# Patient Record
Sex: MALE | Race: WHITE | NOT HISPANIC OR LATINO | Employment: FULL TIME | URBAN - NONMETROPOLITAN AREA
[De-identification: names, ages, dates, MRNs, and addresses within clinical notes are randomized per-mention and may not be internally consistent; named-entity substitution may affect disease eponyms.]

---

## 2019-05-28 RX ORDER — FINASTERIDE 5 MG/1
5 TABLET, FILM COATED ORAL DAILY
COMMUNITY
End: 2021-08-06

## 2019-05-28 RX ORDER — DEXLANSOPRAZOLE 60 MG/1
60 CAPSULE, DELAYED RELEASE ORAL DAILY
COMMUNITY
End: 2022-09-23

## 2019-05-28 RX ORDER — OMEPRAZOLE 20 MG/1
20 CAPSULE, DELAYED RELEASE ORAL DAILY
COMMUNITY
End: 2019-05-30

## 2019-05-28 RX ORDER — TAMSULOSIN HYDROCHLORIDE 0.4 MG/1
1 CAPSULE ORAL NIGHTLY
COMMUNITY
End: 2021-08-06

## 2019-05-28 RX ORDER — OXYBUTYNIN CHLORIDE 5 MG/1
5 TABLET ORAL 2 TIMES DAILY
COMMUNITY
End: 2019-05-30

## 2019-05-30 ENCOUNTER — OFFICE VISIT (OUTPATIENT)
Dept: CARDIOLOGY | Facility: CLINIC | Age: 59
End: 2019-05-30

## 2019-05-30 VITALS
BODY MASS INDEX: 26.07 KG/M2 | OXYGEN SATURATION: 99 % | DIASTOLIC BLOOD PRESSURE: 80 MMHG | WEIGHT: 172 LBS | SYSTOLIC BLOOD PRESSURE: 114 MMHG | HEIGHT: 68 IN | HEART RATE: 82 BPM

## 2019-05-30 DIAGNOSIS — R06.02 SHORTNESS OF BREATH: ICD-10-CM

## 2019-05-30 DIAGNOSIS — R07.89 ATYPICAL CHEST PAIN: Primary | ICD-10-CM

## 2019-05-30 PROCEDURE — 93000 ELECTROCARDIOGRAM COMPLETE: CPT | Performed by: INTERNAL MEDICINE

## 2019-05-30 PROCEDURE — 99203 OFFICE O/P NEW LOW 30 MIN: CPT | Performed by: INTERNAL MEDICINE

## 2019-05-30 NOTE — PROGRESS NOTES
Subjective:     Encounter Date:05/30/2019      Patient ID: Zion Ortiz is a 58 y.o. male who is referred here for further evaluation after a recent abnormal ECG and complaints of intermittent chest discomfort.    Referring provider: Brian Obrien MD  Reason for referral: Abnormal ECG    Chief Complaint: Chest discomfort    Chest Pain    This is a recurrent problem. The onset quality is sudden. The problem occurs intermittently. The problem has been unchanged. The pain is present in the substernal region. The pain is mild. The quality of the pain is described as pressure. The pain does not radiate. Associated symptoms include shortness of breath. Pertinent negatives include no abdominal pain, back pain, claudication, cough, dizziness, exertional chest pressure, fever, headaches, hemoptysis, nausea, numbness, orthopnea, palpitations, PND, syncope or vomiting. The pain is aggravated by nothing. He has tried nothing for the symptoms. Risk factors include male gender.   Pertinent negatives for past medical history include no CAD, no hyperlipidemia and no hypertension.   Pertinent negatives for family medical history include: no CAD.     This is a 58-year-old male with no prior cardiac history but with a history of gastroesophageal reflux disease and chronic intermittent chest discomfort, referred here after recent abnormal ECG.  Patient says that he has a long-standing history of gastroesophageal reflux disease.  From this, he has had intermittent substernal chest discomfort, described as mild, intermittent, burning, not related to activities, nonradiating, no associated signs or symptoms.  In addition, he notes some sharp left-sided chest discomfort intermittently, once again not related to activities, mild, lasting seconds, spontaneously resolving.  The patient had an EKG performed at Dr. Obrien office which showed borderline criteria for left anterior fascicular block with left axis deviation.  The patient was  referred here for further evaluation.  The patient describes mild chronic shortness of breath and dyspnea on exertion, not out of proportion to his level of activities, however.  He denies orthopnea, PND, edema.  He denies lightheadedness, dizziness, syncope, palpitations.    The following portions of the patient's history were reviewed and updated as appropriate: allergies, current medications, past family history, past medical history, past social history, past surgical history and problem list.     Past Medical History:   Diagnosis Date   • Abnormal ECG    • BPH (benign prostatic hyperplasia)    • GERD (gastroesophageal reflux disease)    • Vertigo      History reviewed. No pertinent surgical history.      Current Outpatient Medications:   •  dexlansoprazole (DEXILANT) 60 MG capsule, Take 60 mg by mouth Daily., Disp: , Rfl:   •  finasteride (PROSCAR) 5 MG tablet, Take 5 mg by mouth Daily., Disp: , Rfl:   •  tamsulosin (FLOMAX) 0.4 MG capsule 24 hr capsule, Take 1 capsule by mouth Every Night., Disp: , Rfl:     No Known Allergies    Social History     Tobacco Use   • Smoking status: Never Smoker   • Smokeless tobacco: Never Used   Substance Use Topics   • Alcohol use: No     Frequency: Never     Family History   Problem Relation Age of Onset   • Cancer Father         kidney, bone cancer   • Coronary artery disease Neg Hx      Social History     Tobacco Use   • Smoking status: Never Smoker   • Smokeless tobacco: Never Used   Substance Use Topics   • Alcohol use: No     Frequency: Never     Review of Systems   Constitution: Negative for chills, fever, night sweats and weight loss.   HENT: Negative for congestion and hearing loss.    Eyes: Negative for blurred vision and pain.   Cardiovascular: Positive for chest pain and dyspnea on exertion (occasoinally). Negative for claudication, leg swelling, orthopnea, palpitations, paroxysmal nocturnal dyspnea and syncope.   Respiratory: Positive for shortness of breath.  Negative for cough, hemoptysis and wheezing.    Endocrine: Negative for cold intolerance, heat intolerance, polydipsia and polyuria.   Hematologic/Lymphatic: Negative for adenopathy and bleeding problem. Does not bruise/bleed easily.   Skin: Negative for color change, poor wound healing and rash.   Musculoskeletal: Negative for arthritis, back pain, joint pain, joint swelling, myalgias and neck pain.   Gastrointestinal: Positive for heartburn. Negative for abdominal pain, change in bowel habit, constipation, diarrhea, hematochezia, melena, nausea and vomiting.   Genitourinary: Negative for dysuria, frequency, hematuria and nocturia.   Neurological: Negative for dizziness, focal weakness, headaches, light-headedness, loss of balance and numbness.   Psychiatric/Behavioral: Negative for altered mental status, memory loss and substance abuse.   Allergic/Immunologic: Negative for hives and persistent infections.       ECG 12 Lead  Date/Time: 5/30/2019 11:24 AM  Performed by: Regan Mello MD  Authorized by: Regan Mello MD   Rhythm: sinus rhythm  Rate: normal  BPM: 69  Conduction: conduction normal  ST Segments: ST segments normal  T Waves: T waves normal  QRS axis: left  Other: no other findings    Clinical impression: abnormal EKG             Objective:     Physical Exam   Constitutional: He is oriented to person, place, and time. Vital signs are normal. He appears well-developed and well-nourished. He is cooperative.  Non-toxic appearance. No distress.   HENT:   Head: Normocephalic and atraumatic.   Right Ear: External ear normal.   Left Ear: External ear normal.   Nose: Nose normal.   Mouth/Throat: Uvula is midline, oropharynx is clear and moist and mucous membranes are normal. Mucous membranes are not pale, not dry and not cyanotic. No oropharyngeal exudate.   Eyes: EOM and lids are normal. Pupils are equal, round, and reactive to light.   Neck: Normal range of motion. Neck supple. No  "hepatojugular reflux and no JVD present. Carotid bruit is not present. No tracheal deviation and no edema present. No thyroid mass and no thyromegaly present.   Cardiovascular: Normal rate, regular rhythm, S1 normal, S2 normal, normal heart sounds, intact distal pulses and normal pulses.  No extrasystoles are present. PMI is not displaced. Exam reveals no gallop and no friction rub.   No murmur heard.  Pulses:       Radial pulses are 2+ on the right side, and 2+ on the left side.        Femoral pulses are 2+ on the right side, and 2+ on the left side.       Dorsalis pedis pulses are 2+ on the right side, and 2+ on the left side.        Posterior tibial pulses are 2+ on the right side, and 2+ on the left side.   Pulmonary/Chest: Effort normal and breath sounds normal. No accessory muscle usage. No respiratory distress. He has no wheezes. He has no rales. He exhibits no tenderness.   Abdominal: Soft. Normal appearance and bowel sounds are normal. He exhibits no distension, no abdominal bruit and no pulsatile midline mass. There is no hepatosplenomegaly. There is no tenderness.   Musculoskeletal: Normal range of motion. He exhibits no edema, tenderness or deformity.   Lymphadenopathy:     He has no cervical adenopathy.   Neurological: He is oriented to person, place, and time. He has normal strength. No cranial nerve deficit.   Skin: Skin is warm, dry and intact. No rash noted. He is not diaphoretic. No cyanosis or erythema. Nails show no clubbing.   Psychiatric: He has a normal mood and affect. His speech is normal and behavior is normal. Thought content normal.   Vitals reviewed.    /80 (BP Location: Left arm, Patient Position: Sitting)   Pulse 82   Ht 172.7 cm (68\")   Wt 78 kg (172 lb)   SpO2 99%   BMI 26.15 kg/m²         Assessment:          Diagnosis Plan   1. Atypical chest pain  ECG 12 Lead    Treadmill Stress Test   2. Shortness of breath            Plan:       1.  Atypical chest pain: Patient " describes chest pain that is atypical.  While this may be secondary to gastroesophageal reflux disease, he describes 2 different types of discomfort, one more consistent with GERD, one being more atypical.  I think that a treadmill stress test is reasonable to further evaluate, especially given the patient's complaints of some mild shortness of breath and dyspnea on exertion as well.    2.  Shortness of breath: The patient describes mild chronic shortness of breath and dyspnea on exertion, not out of proportion to his level of activities, however.  I think that coupled with his ECG which is somewhat abnormal along with his atypical chest discomfort complaints, it is reasonable to perform a stress test to further evaluate.    The patient's ECG is somewhat abnormal with left axis deviation although today does not meet criteria for left anterior fascicular block.  No other significant findings on the ECG.    Patient's Body mass index is 26.15 kg/m². BMI is above normal parameters. Recommendations include: exercise counseling and nutrition counseling.    Follow-up: Pending the results of the patient's treadmill stress test.

## 2019-06-06 ENCOUNTER — HOSPITAL ENCOUNTER (OUTPATIENT)
Dept: CARDIOLOGY | Facility: HOSPITAL | Age: 59
Discharge: HOME | End: 2019-06-06
Admitting: INTERNAL MEDICINE

## 2019-06-06 VITALS
WEIGHT: 171.96 LBS | HEART RATE: 76 BPM | HEIGHT: 68 IN | DIASTOLIC BLOOD PRESSURE: 88 MMHG | SYSTOLIC BLOOD PRESSURE: 121 MMHG | BODY MASS INDEX: 26.06 KG/M2

## 2019-06-06 DIAGNOSIS — R07.89 ATYPICAL CHEST PAIN: ICD-10-CM

## 2019-06-06 LAB
BH CV STRESS BP STAGE 1: NORMAL
BH CV STRESS BP STAGE 2: NORMAL
BH CV STRESS BP STAGE 3: NORMAL
BH CV STRESS DURATION MIN STAGE 1: 3
BH CV STRESS DURATION MIN STAGE 2: 3
BH CV STRESS DURATION MIN STAGE 3: 3
BH CV STRESS DURATION SEC STAGE 1: 0
BH CV STRESS DURATION SEC STAGE 2: 0
BH CV STRESS DURATION SEC STAGE 3: 0
BH CV STRESS GRADE STAGE 1: 10
BH CV STRESS GRADE STAGE 2: 12
BH CV STRESS GRADE STAGE 3: 14
BH CV STRESS HR STAGE 1: 107
BH CV STRESS HR STAGE 2: 130
BH CV STRESS HR STAGE 3: 153
BH CV STRESS METS STAGE 1: 5
BH CV STRESS METS STAGE 2: 7.5
BH CV STRESS METS STAGE 3: 10
BH CV STRESS PROTOCOL 1: NORMAL
BH CV STRESS RECOVERY BP: NORMAL MMHG
BH CV STRESS RECOVERY HR: 100 BPM
BH CV STRESS SPEED STAGE 1: 1.7
BH CV STRESS SPEED STAGE 2: 2.5
BH CV STRESS SPEED STAGE 3: 3.4
BH CV STRESS STAGE 1: 1
BH CV STRESS STAGE 2: 2
BH CV STRESS STAGE 3: 3
MAXIMAL PREDICTED HEART RATE: 161 BPM
PERCENT MAX PREDICTED HR: 95.03 %
STRESS BASELINE BP: NORMAL MMHG
STRESS BASELINE HR: 76 BPM
STRESS PERCENT HR: 112 %
STRESS POST ESTIMATED WORKLOAD: 10 METS
STRESS POST EXERCISE DUR MIN: 9 MIN
STRESS POST EXERCISE DUR SEC: 0 SEC
STRESS POST PEAK BP: NORMAL MMHG
STRESS POST PEAK HR: 153 BPM
STRESS TARGET HR: 137 BPM

## 2019-06-06 PROCEDURE — 93017 CV STRESS TEST TRACING ONLY: CPT

## 2019-06-06 PROCEDURE — 93018 CV STRESS TEST I&R ONLY: CPT | Performed by: INTERNAL MEDICINE

## 2020-11-04 ENCOUNTER — HOSPITAL ENCOUNTER (EMERGENCY)
Facility: HOSPITAL | Age: 60
Discharge: HOME | End: 2020-11-04
Attending: EMERGENCY MEDICINE | Admitting: EMERGENCY MEDICINE

## 2020-11-04 ENCOUNTER — APPOINTMENT (OUTPATIENT)
Dept: GENERAL RADIOLOGY | Facility: HOSPITAL | Age: 60
End: 2020-11-04

## 2020-11-04 ENCOUNTER — APPOINTMENT (OUTPATIENT)
Dept: CT IMAGING | Facility: HOSPITAL | Age: 60
End: 2020-11-04

## 2020-11-04 VITALS
TEMPERATURE: 98 F | HEART RATE: 95 BPM | HEIGHT: 68 IN | RESPIRATION RATE: 18 BRPM | BODY MASS INDEX: 25.46 KG/M2 | OXYGEN SATURATION: 94 % | WEIGHT: 168 LBS | SYSTOLIC BLOOD PRESSURE: 129 MMHG | DIASTOLIC BLOOD PRESSURE: 92 MMHG

## 2020-11-04 DIAGNOSIS — J12.82 PNEUMONIA DUE TO COVID-19 VIRUS: ICD-10-CM

## 2020-11-04 DIAGNOSIS — U07.1 COVID-19 VIRUS INFECTION: Primary | ICD-10-CM

## 2020-11-04 DIAGNOSIS — U07.1 PNEUMONIA DUE TO COVID-19 VIRUS: ICD-10-CM

## 2020-11-04 LAB
ALBUMIN SERPL-MCNC: 3.9 G/DL (ref 3.5–5.2)
ALBUMIN/GLOB SERPL: 1.3 G/DL
ALP SERPL-CCNC: 89 U/L (ref 39–117)
ALT SERPL W P-5'-P-CCNC: 15 U/L (ref 1–41)
ANION GAP SERPL CALCULATED.3IONS-SCNC: 11 MMOL/L (ref 5–15)
ARTERIAL PATENCY WRIST A: POSITIVE
AST SERPL-CCNC: 20 U/L (ref 1–40)
ATMOSPHERIC PRESS: 757 MMHG
BASE EXCESS BLDA CALC-SCNC: 1.8 MMOL/L (ref 0–2)
BASOPHILS # BLD AUTO: 0.01 10*3/MM3 (ref 0–0.2)
BASOPHILS NFR BLD AUTO: 0.2 % (ref 0–1.5)
BDY SITE: ABNORMAL
BILIRUB SERPL-MCNC: 0.3 MG/DL (ref 0–1.2)
BODY TEMPERATURE: 37 C
BUN SERPL-MCNC: 16 MG/DL (ref 8–23)
BUN/CREAT SERPL: 18.6 (ref 7–25)
CALCIUM SPEC-SCNC: 8.6 MG/DL (ref 8.6–10.5)
CHLORIDE SERPL-SCNC: 103 MMOL/L (ref 98–107)
CO2 SERPL-SCNC: 26 MMOL/L (ref 22–29)
CREAT SERPL-MCNC: 0.86 MG/DL (ref 0.76–1.27)
D DIMER PPP FEU-MCNC: 0.46 MG/L (FEU) (ref 0–0.5)
D-LACTATE SERPL-SCNC: 1.1 MMOL/L (ref 0.5–2)
DEPRECATED RDW RBC AUTO: 36.1 FL (ref 37–54)
EOSINOPHIL # BLD AUTO: 0 10*3/MM3 (ref 0–0.4)
EOSINOPHIL NFR BLD AUTO: 0 % (ref 0.3–6.2)
ERYTHROCYTE [DISTWIDTH] IN BLOOD BY AUTOMATED COUNT: 12.2 % (ref 12.3–15.4)
FERRITIN SERPL-MCNC: 276.8 NG/ML (ref 30–400)
GFR SERPL CREATININE-BSD FRML MDRD: 90 ML/MIN/1.73
GLOBULIN UR ELPH-MCNC: 3 GM/DL
GLUCOSE SERPL-MCNC: 129 MG/DL (ref 65–99)
HCO3 BLDA-SCNC: 25.4 MMOL/L (ref 20–26)
HCT VFR BLD AUTO: 39.9 % (ref 37.5–51)
HGB BLD-MCNC: 13.8 G/DL (ref 13–17.7)
HOLD SPECIMEN: NORMAL
HOLD SPECIMEN: NORMAL
IMM GRANULOCYTES # BLD AUTO: 0.02 10*3/MM3 (ref 0–0.05)
IMM GRANULOCYTES NFR BLD AUTO: 0.4 % (ref 0–0.5)
LDH SERPL-CCNC: 304 U/L (ref 135–225)
LYMPHOCYTES # BLD AUTO: 0.8 10*3/MM3 (ref 0.7–3.1)
LYMPHOCYTES NFR BLD AUTO: 15.3 % (ref 19.6–45.3)
Lab: ABNORMAL
MCH RBC QN AUTO: 28 PG (ref 26.6–33)
MCHC RBC AUTO-ENTMCNC: 34.6 G/DL (ref 31.5–35.7)
MCV RBC AUTO: 81.1 FL (ref 79–97)
MODALITY: ABNORMAL
MONOCYTES # BLD AUTO: 0.34 10*3/MM3 (ref 0.1–0.9)
MONOCYTES NFR BLD AUTO: 6.5 % (ref 5–12)
NEUTROPHILS NFR BLD AUTO: 4.07 10*3/MM3 (ref 1.7–7)
NEUTROPHILS NFR BLD AUTO: 77.6 % (ref 42.7–76)
NRBC BLD AUTO-RTO: 0 /100 WBC (ref 0–0.2)
NT-PROBNP SERPL-MCNC: 23.3 PG/ML (ref 0–900)
PCO2 BLDA: 36 MM HG (ref 35–45)
PCO2 TEMP ADJ BLD: 36 MM HG (ref 35–45)
PH BLDA: 7.46 PH UNITS (ref 7.35–7.45)
PH, TEMP CORRECTED: 7.46 PH UNITS (ref 7.35–7.45)
PLATELET # BLD AUTO: 189 10*3/MM3 (ref 140–450)
PMV BLD AUTO: 9.1 FL (ref 6–12)
PO2 BLDA: 60.3 MM HG (ref 83–108)
PO2 TEMP ADJ BLD: 60.3 MM HG (ref 83–108)
POTASSIUM SERPL-SCNC: 3.5 MMOL/L (ref 3.5–5.2)
PROCALCITONIN SERPL-MCNC: 0.12 NG/ML (ref 0–0.25)
PROT SERPL-MCNC: 6.9 G/DL (ref 6–8.5)
RBC # BLD AUTO: 4.92 10*6/MM3 (ref 4.14–5.8)
SAO2 % BLDCOA: 93.5 % (ref 94–99)
SODIUM SERPL-SCNC: 140 MMOL/L (ref 136–145)
TROPONIN T SERPL-MCNC: <0.01 NG/ML (ref 0–0.03)
VENTILATOR MODE: ABNORMAL
WBC # BLD AUTO: 5.24 10*3/MM3 (ref 3.4–10.8)
WHOLE BLOOD HOLD SPECIMEN: NORMAL
WHOLE BLOOD HOLD SPECIMEN: NORMAL

## 2020-11-04 PROCEDURE — 85379 FIBRIN DEGRADATION QUANT: CPT | Performed by: EMERGENCY MEDICINE

## 2020-11-04 PROCEDURE — 0 IOPAMIDOL PER 1 ML: Performed by: EMERGENCY MEDICINE

## 2020-11-04 PROCEDURE — 84145 PROCALCITONIN (PCT): CPT | Performed by: EMERGENCY MEDICINE

## 2020-11-04 PROCEDURE — 85025 COMPLETE CBC W/AUTO DIFF WBC: CPT | Performed by: EMERGENCY MEDICINE

## 2020-11-04 PROCEDURE — 83880 ASSAY OF NATRIURETIC PEPTIDE: CPT | Performed by: EMERGENCY MEDICINE

## 2020-11-04 PROCEDURE — 99283 EMERGENCY DEPT VISIT LOW MDM: CPT

## 2020-11-04 PROCEDURE — 87040 BLOOD CULTURE FOR BACTERIA: CPT | Performed by: EMERGENCY MEDICINE

## 2020-11-04 PROCEDURE — 71275 CT ANGIOGRAPHY CHEST: CPT

## 2020-11-04 PROCEDURE — 93010 ELECTROCARDIOGRAM REPORT: CPT | Performed by: INTERNAL MEDICINE

## 2020-11-04 PROCEDURE — 25010000002 DEXAMETHASONE PER 1 MG: Performed by: EMERGENCY MEDICINE

## 2020-11-04 PROCEDURE — 84484 ASSAY OF TROPONIN QUANT: CPT | Performed by: EMERGENCY MEDICINE

## 2020-11-04 PROCEDURE — 82803 BLOOD GASES ANY COMBINATION: CPT

## 2020-11-04 PROCEDURE — 82728 ASSAY OF FERRITIN: CPT | Performed by: EMERGENCY MEDICINE

## 2020-11-04 PROCEDURE — 83615 LACTATE (LD) (LDH) ENZYME: CPT | Performed by: EMERGENCY MEDICINE

## 2020-11-04 PROCEDURE — 96374 THER/PROPH/DIAG INJ IV PUSH: CPT

## 2020-11-04 PROCEDURE — 99284 EMERGENCY DEPT VISIT MOD MDM: CPT

## 2020-11-04 PROCEDURE — 93005 ELECTROCARDIOGRAM TRACING: CPT | Performed by: EMERGENCY MEDICINE

## 2020-11-04 PROCEDURE — 80053 COMPREHEN METABOLIC PANEL: CPT | Performed by: EMERGENCY MEDICINE

## 2020-11-04 PROCEDURE — 36600 WITHDRAWAL OF ARTERIAL BLOOD: CPT

## 2020-11-04 PROCEDURE — 83605 ASSAY OF LACTIC ACID: CPT | Performed by: EMERGENCY MEDICINE

## 2020-11-04 PROCEDURE — 71045 X-RAY EXAM CHEST 1 VIEW: CPT

## 2020-11-04 RX ORDER — DEXAMETHASONE SODIUM PHOSPHATE 10 MG/ML
10 INJECTION INTRAMUSCULAR; INTRAVENOUS ONCE
Status: COMPLETED | OUTPATIENT
Start: 2020-11-04 | End: 2020-11-04

## 2020-11-04 RX ORDER — AZITHROMYCIN 250 MG/1
250 TABLET, FILM COATED ORAL DAILY
Qty: 6 TABLET | Refills: 0 | Status: SHIPPED | OUTPATIENT
Start: 2020-11-04 | End: 2022-03-31

## 2020-11-04 RX ORDER — DEXAMETHASONE 4 MG/1
6 TABLET ORAL
Qty: 8 TABLET | Refills: 0 | Status: SHIPPED | OUTPATIENT
Start: 2020-11-04 | End: 2020-11-09

## 2020-11-04 RX ORDER — SODIUM CHLORIDE 0.9 % (FLUSH) 0.9 %
10 SYRINGE (ML) INJECTION AS NEEDED
Status: DISCONTINUED | OUTPATIENT
Start: 2020-11-04 | End: 2020-11-04 | Stop reason: HOSPADM

## 2020-11-04 RX ADMIN — IOPAMIDOL 100 ML: 755 INJECTION, SOLUTION INTRAVENOUS at 15:40

## 2020-11-04 RX ADMIN — DEXAMETHASONE SODIUM PHOSPHATE 10 MG: 10 INJECTION INTRAMUSCULAR; INTRAVENOUS at 14:11

## 2020-11-04 NOTE — ED PROVIDER NOTES
Subjective   Patient with shortness of breath recently tested positive for Covid with fever getting progressively worse shortness of breath came to ER for evaluation      Cough  Cough characteristics:  Productive  Severity:  Moderate  Onset quality:  Gradual  Timing:  Constant  Progression:  Worsening  Chronicity:  New  Context: not animal exposure, not exposure to allergens, not sick contacts and not weather changes    Relieved by:  Nothing  Worsened by:  Activity  Ineffective treatments:  None tried  Associated symptoms: fever, shortness of breath and wheezing    Associated symptoms: no chest pain, no chills, no diaphoresis, no ear pain, no headaches, no rash, no sore throat and no weight loss    Risk factors: no chemical exposure and no recent infection    Fever  Associated symptoms: cough    Associated symptoms: no chest pain, no chills, no ear pain, no headaches, no nausea, no rash and no sore throat        Review of Systems   Constitutional: Positive for fever. Negative for chills, diaphoresis and weight loss.   HENT: Negative.  Negative for ear pain and sore throat.    Respiratory: Positive for cough, shortness of breath and wheezing.    Cardiovascular: Negative for chest pain.   Gastrointestinal: Negative.  Negative for abdominal distention, abdominal pain and nausea.   Endocrine: Negative.    Genitourinary: Negative.    Musculoskeletal: Negative.  Negative for back pain and neck pain.   Skin: Negative for color change, pallor and rash.   Neurological: Negative.  Negative for syncope, weakness, light-headedness, numbness and headaches.   Hematological: Negative.  Does not bruise/bleed easily.   All other systems reviewed and are negative.      Past Medical History:   Diagnosis Date   • Abnormal ECG    • BPH (benign prostatic hyperplasia)    • GERD (gastroesophageal reflux disease)    • Vertigo        No Known Allergies    No past surgical history on file.    Family History   Problem Relation Age of Onset   •  Cancer Father         kidney, bone cancer   • Coronary artery disease Neg Hx        Social History     Socioeconomic History   • Marital status:      Spouse name: Not on file   • Number of children: Not on file   • Years of education: Not on file   • Highest education level: Not on file   Tobacco Use   • Smoking status: Never Smoker   • Smokeless tobacco: Never Used   Substance and Sexual Activity   • Alcohol use: No     Frequency: Never   • Drug use: No   • Sexual activity: Defer           Objective   Physical Exam  Vitals signs and nursing note reviewed. Exam conducted with a chaperone present.   Constitutional:       General: He is not in acute distress.     Appearance: Normal appearance. He is well-developed. He is not toxic-appearing.   HENT:      Head: Normocephalic and atraumatic.      Nose: Nose normal.      Mouth/Throat:      Mouth: Mucous membranes are moist.      Pharynx: Uvula midline.   Eyes:      General: Lids are normal. Lids are everted, no foreign bodies appreciated.      Conjunctiva/sclera: Conjunctivae normal.      Pupils: Pupils are equal, round, and reactive to light.   Neck:      Musculoskeletal: Full passive range of motion without pain, normal range of motion and neck supple. No neck rigidity.      Vascular: Normal carotid pulses. No carotid bruit or JVD.      Trachea: Trachea and phonation normal. No tracheal deviation.   Cardiovascular:      Rate and Rhythm: Normal rate and regular rhythm.      Chest Wall: PMI is not displaced.      Pulses: Normal pulses.      Heart sounds: Normal heart sounds. No gallop.    Pulmonary:      Effort: Pulmonary effort is normal. No tachypnea, accessory muscle usage or respiratory distress.      Breath sounds: No stridor. Wheezing present. No decreased breath sounds, rhonchi or rales.      Comments: Scattered wheezes no dullness to percussion midline trachea  Abdominal:      General: Bowel sounds are normal. There is no distension.      Palpations:  Abdomen is soft.      Tenderness: There is no abdominal tenderness.   Musculoskeletal: Normal range of motion.         General: No swelling.      Comments: Lower extremity exam bilaterally is unremarkable.  There is no right or left calf tenderness .  There is no palpable venous cord.  No obvious difference in the size of the legs.  No pitting edema.  The dorsalis pedis and posterior tibial femoral and popliteal pulses are palpable and +2 bilaterally.  Homans sign is negative   Skin:     General: Skin is warm and dry.      Capillary Refill: Capillary refill takes less than 2 seconds.      Coloration: Skin is not jaundiced or pale.      Nails: There is no clubbing.     Neurological:      General: No focal deficit present.      Mental Status: He is alert and oriented to person, place, and time.      GCS: GCS eye subscore is 4. GCS verbal subscore is 5. GCS motor subscore is 6.      Cranial Nerves: Cranial nerves are intact. No cranial nerve deficit.      Motor: Motor function is intact.      Gait: Gait normal.      Deep Tendon Reflexes: Reflexes are normal and symmetric. Reflexes normal.   Psychiatric:         Speech: Speech normal.         Behavior: Behavior normal.         Procedures           ED Course  ED Course as of Nov 04 1657   Wed Nov 04, 2020   1435 Normal sinus rhythm left axis deviation    [TS]   1651 Patient was recently seen by me the patient states that he was tested positive for Covid was getting worse and he was sent by Dr. Gar after the case were discussed with the hospital doctorsTherefore work-up was initiated in the ED the patient's D-dimer is negative I did a CT of the chest and a chest x-ray the CT of the chest were performed because he is complaining of shortness of breath his Wells score would be 3 CT of the chest does not reveal any PE but does believe patchy haziness chest x-ray is negative his lab work-up is normal inflammatory markers are normal his pH 7.45 with a PCO2 36 and a PO2 of 60  his oxygen saturations without supplemental oxygen room air have been 94 to 95% he does not appear to be in respiratory distress I have called  to see if he had except the patient from Dr. Cong Chandler recommends to discharge the patient home since he does not fulfill any criteria of admission.    [TS]   1652 I have discussed this case with the patient he is agreeable in going home he is going be given a pulse ox meter and will put him on steroids and antibiotics and asked to follow the primary MD closely.  If his condition worsen he can come back to the ER    [TS]   1653 Risks and benefits of treatments given and alternative treatment options discussed with patient/family. I answered all the questions in simple, plain language, and there was voiced understanding and agreement with plan of care. There were no further questions. Differential diagnosis discussed. Patient/family was advised that the practice of medicine is not always an exact science, and sometimes tests, physical exam, or history may not show the underlying conditions with certainty. Additionally, the condition may change or show itself later after initial presentation. There was also expressed understanding and agreement with this limitation of emergency medicine practice. Patient/family was asked to return to ED if any problem or issues or if condition worsens or does not improved. Patient/family agreed to follow up with PCP/specialist as advised, or return to ED if unable to see a provider in a timely fashion for continued symptoms.     [TS]      ED Course User Index  [TS] Narayan Celis MD Wells' Criteria (for pulmonary embolism) reviewed and/or performed as part of the patient evaluation and treatment planning process.  The result associated with this review/performance is: 3       MDM  Number of Diagnoses or Management Options  Diagnosis management comments: Differential Diagnosis:  I considered  pulmonary etiology, asthma, chronic obstructive pulmonary disease, pneumonia, pulmonary embolism, adult respiratory distress syndrome, pneumothorax, pleural effusion, pulmonary fibrosis, cardiac etiology, congestive heart failure, myocardial infarction, metabolic etiology, diabetic ketoacidosis, uremia, acidosis, sepsis, anemia, drug related etiology, hyperventilation and CNS disease as a possible cause of dyspnea in this patient. This is a partial list of diagnoses considered.            Amount and/or Complexity of Data Reviewed  Clinical lab tests: ordered and reviewed  Tests in the radiology section of CPT®: ordered and reviewed  Tests in the medicine section of CPT®: ordered and reviewed    Risk of Complications, Morbidity, and/or Mortality  Presenting problems: moderate  Diagnostic procedures: moderate  Management options: moderate        Final diagnoses:   COVID-19 virus infection   Pneumonia due to COVID-19 virus            Narayan Celis MD  11/04/20 1521       Narayan Celis MD  11/04/20 [id number]

## 2020-11-07 LAB
QT INTERVAL: 352 MS
QTC INTERVAL: 442 MS

## 2020-11-08 ENCOUNTER — HOSPITAL ENCOUNTER (EMERGENCY)
Facility: HOSPITAL | Age: 60
Discharge: HOME | End: 2020-11-08
Admitting: EMERGENCY MEDICINE

## 2020-11-08 ENCOUNTER — APPOINTMENT (OUTPATIENT)
Dept: GENERAL RADIOLOGY | Facility: HOSPITAL | Age: 60
End: 2020-11-08

## 2020-11-08 VITALS
RESPIRATION RATE: 18 BRPM | SYSTOLIC BLOOD PRESSURE: 120 MMHG | WEIGHT: 170 LBS | DIASTOLIC BLOOD PRESSURE: 80 MMHG | OXYGEN SATURATION: 98 % | TEMPERATURE: 98.2 F | BODY MASS INDEX: 25.76 KG/M2 | HEART RATE: 80 BPM | HEIGHT: 68 IN

## 2020-11-08 DIAGNOSIS — U07.1 COVID-19: Primary | ICD-10-CM

## 2020-11-08 LAB
ABO GROUP BLD: NORMAL
ALBUMIN SERPL-MCNC: 3.7 G/DL (ref 3.5–5.2)
ALBUMIN/GLOB SERPL: 1.1 G/DL
ALP SERPL-CCNC: 76 U/L (ref 39–117)
ALT SERPL W P-5'-P-CCNC: 15 U/L (ref 1–41)
ANION GAP SERPL CALCULATED.3IONS-SCNC: 10 MMOL/L (ref 5–15)
ARTERIAL PATENCY WRIST A: POSITIVE
AST SERPL-CCNC: 16 U/L (ref 1–40)
ATMOSPHERIC PRESS: 754 MMHG
B PARAPERT DNA SPEC QL NAA+PROBE: NOT DETECTED
B PERT DNA SPEC QL NAA+PROBE: NOT DETECTED
BASE EXCESS BLDA CALC-SCNC: 0.1 MMOL/L (ref 0–2)
BASOPHILS # BLD AUTO: 0.02 10*3/MM3 (ref 0–0.2)
BASOPHILS NFR BLD AUTO: 0.2 % (ref 0–1.5)
BDY SITE: ABNORMAL
BILIRUB SERPL-MCNC: 0.4 MG/DL (ref 0–1.2)
BLD GP AB SCN SERPL QL: NEGATIVE
BODY TEMPERATURE: 37 C
BUN SERPL-MCNC: 16 MG/DL (ref 8–23)
BUN/CREAT SERPL: 19 (ref 7–25)
C PNEUM DNA NPH QL NAA+NON-PROBE: NOT DETECTED
CALCIUM SPEC-SCNC: 9.3 MG/DL (ref 8.6–10.5)
CHLORIDE SERPL-SCNC: 104 MMOL/L (ref 98–107)
CO2 SERPL-SCNC: 25 MMOL/L (ref 22–29)
CREAT SERPL-MCNC: 0.84 MG/DL (ref 0.76–1.27)
CRP SERPL-MCNC: 10.12 MG/DL (ref 0–0.5)
D-LACTATE SERPL-SCNC: 1.1 MMOL/L (ref 0.5–2)
DEPRECATED RDW RBC AUTO: 34.8 FL (ref 37–54)
EOSINOPHIL # BLD AUTO: 0.05 10*3/MM3 (ref 0–0.4)
EOSINOPHIL NFR BLD AUTO: 0.6 % (ref 0.3–6.2)
ERYTHROCYTE [DISTWIDTH] IN BLOOD BY AUTOMATED COUNT: 12.1 % (ref 12.3–15.4)
FERRITIN SERPL-MCNC: 659 NG/ML (ref 30–400)
FLUAV H1 2009 PAND RNA NPH QL NAA+PROBE: NOT DETECTED
FLUAV H1 HA GENE NPH QL NAA+PROBE: NOT DETECTED
FLUAV H3 RNA NPH QL NAA+PROBE: NOT DETECTED
FLUAV SUBTYP SPEC NAA+PROBE: NOT DETECTED
FLUBV RNA ISLT QL NAA+PROBE: NOT DETECTED
GFR SERPL CREATININE-BSD FRML MDRD: 93 ML/MIN/1.73
GLOBULIN UR ELPH-MCNC: 3.5 GM/DL
GLUCOSE SERPL-MCNC: 136 MG/DL (ref 65–99)
HADV DNA SPEC NAA+PROBE: NOT DETECTED
HCO3 BLDA-SCNC: 23.7 MMOL/L (ref 20–26)
HCOV 229E RNA SPEC QL NAA+PROBE: NOT DETECTED
HCOV HKU1 RNA SPEC QL NAA+PROBE: NOT DETECTED
HCOV NL63 RNA SPEC QL NAA+PROBE: NOT DETECTED
HCOV OC43 RNA SPEC QL NAA+PROBE: NOT DETECTED
HCT VFR BLD AUTO: 38.9 % (ref 37.5–51)
HGB BLD-MCNC: 13.7 G/DL (ref 13–17.7)
HMPV RNA NPH QL NAA+NON-PROBE: NOT DETECTED
HOLD SPECIMEN: NORMAL
HPIV1 RNA SPEC QL NAA+PROBE: NOT DETECTED
HPIV2 RNA SPEC QL NAA+PROBE: NOT DETECTED
HPIV3 RNA NPH QL NAA+PROBE: NOT DETECTED
HPIV4 P GENE NPH QL NAA+PROBE: NOT DETECTED
IMM GRANULOCYTES # BLD AUTO: 0.03 10*3/MM3 (ref 0–0.05)
IMM GRANULOCYTES NFR BLD AUTO: 0.4 % (ref 0–0.5)
LDH SERPL-CCNC: 282 U/L (ref 135–225)
LYMPHOCYTES # BLD AUTO: 1.01 10*3/MM3 (ref 0.7–3.1)
LYMPHOCYTES NFR BLD AUTO: 12.1 % (ref 19.6–45.3)
Lab: ABNORMAL
M PNEUMO IGG SER IA-ACNC: NOT DETECTED
MCH RBC QN AUTO: 27.8 PG (ref 26.6–33)
MCHC RBC AUTO-ENTMCNC: 35.2 G/DL (ref 31.5–35.7)
MCV RBC AUTO: 79.1 FL (ref 79–97)
MODALITY: ABNORMAL
MONOCYTES # BLD AUTO: 0.51 10*3/MM3 (ref 0.1–0.9)
MONOCYTES NFR BLD AUTO: 6.1 % (ref 5–12)
NEUTROPHILS NFR BLD AUTO: 6.74 10*3/MM3 (ref 1.7–7)
NEUTROPHILS NFR BLD AUTO: 80.6 % (ref 42.7–76)
NRBC BLD AUTO-RTO: 0 /100 WBC (ref 0–0.2)
NT-PROBNP SERPL-MCNC: 42.9 PG/ML (ref 0–900)
PCO2 BLDA: 34.6 MM HG (ref 35–45)
PCO2 TEMP ADJ BLD: 34.6 MM HG (ref 35–45)
PH BLDA: 7.44 PH UNITS (ref 7.35–7.45)
PH, TEMP CORRECTED: 7.44 PH UNITS (ref 7.35–7.45)
PLATELET # BLD AUTO: 285 10*3/MM3 (ref 140–450)
PMV BLD AUTO: 9.2 FL (ref 6–12)
PO2 BLDA: 63.3 MM HG (ref 83–108)
PO2 TEMP ADJ BLD: 63.3 MM HG (ref 83–108)
POTASSIUM SERPL-SCNC: 3.5 MMOL/L (ref 3.5–5.2)
PROCALCITONIN SERPL-MCNC: 0.2 NG/ML (ref 0–0.25)
PROT SERPL-MCNC: 7.2 G/DL (ref 6–8.5)
RBC # BLD AUTO: 4.92 10*6/MM3 (ref 4.14–5.8)
RH BLD: POSITIVE
RHINOVIRUS RNA SPEC NAA+PROBE: NOT DETECTED
RSV RNA NPH QL NAA+NON-PROBE: NOT DETECTED
S PNEUM AG SPEC QL LA: NEGATIVE
SAO2 % BLDCOA: 94.3 % (ref 94–99)
SARS-COV-2 RNA NPH QL NAA+NON-PROBE: DETECTED
SODIUM SERPL-SCNC: 139 MMOL/L (ref 136–145)
T&S EXPIRATION DATE: NORMAL
TROPONIN T SERPL-MCNC: <0.01 NG/ML (ref 0–0.03)
VENTILATOR MODE: ABNORMAL
WBC # BLD AUTO: 8.36 10*3/MM3 (ref 3.4–10.8)
WHOLE BLOOD HOLD SPECIMEN: NORMAL
WHOLE BLOOD HOLD SPECIMEN: NORMAL

## 2020-11-08 PROCEDURE — 80053 COMPREHEN METABOLIC PANEL: CPT | Performed by: NURSE PRACTITIONER

## 2020-11-08 PROCEDURE — 84484 ASSAY OF TROPONIN QUANT: CPT | Performed by: NURSE PRACTITIONER

## 2020-11-08 PROCEDURE — 93005 ELECTROCARDIOGRAM TRACING: CPT

## 2020-11-08 PROCEDURE — 86140 C-REACTIVE PROTEIN: CPT | Performed by: NURSE PRACTITIONER

## 2020-11-08 PROCEDURE — 87040 BLOOD CULTURE FOR BACTERIA: CPT | Performed by: NURSE PRACTITIONER

## 2020-11-08 PROCEDURE — 87899 AGENT NOS ASSAY W/OPTIC: CPT | Performed by: NURSE PRACTITIONER

## 2020-11-08 PROCEDURE — 82728 ASSAY OF FERRITIN: CPT | Performed by: NURSE PRACTITIONER

## 2020-11-08 PROCEDURE — 84145 PROCALCITONIN (PCT): CPT | Performed by: NURSE PRACTITIONER

## 2020-11-08 PROCEDURE — 86901 BLOOD TYPING SEROLOGIC RH(D): CPT | Performed by: NURSE PRACTITIONER

## 2020-11-08 PROCEDURE — 85025 COMPLETE CBC W/AUTO DIFF WBC: CPT | Performed by: NURSE PRACTITIONER

## 2020-11-08 PROCEDURE — 71045 X-RAY EXAM CHEST 1 VIEW: CPT

## 2020-11-08 PROCEDURE — 93010 ELECTROCARDIOGRAM REPORT: CPT | Performed by: INTERNAL MEDICINE

## 2020-11-08 PROCEDURE — 0202U NFCT DS 22 TRGT SARS-COV-2: CPT | Performed by: NURSE PRACTITIONER

## 2020-11-08 PROCEDURE — 86850 RBC ANTIBODY SCREEN: CPT | Performed by: NURSE PRACTITIONER

## 2020-11-08 PROCEDURE — 36600 WITHDRAWAL OF ARTERIAL BLOOD: CPT

## 2020-11-08 PROCEDURE — 99283 EMERGENCY DEPT VISIT LOW MDM: CPT

## 2020-11-08 PROCEDURE — 83880 ASSAY OF NATRIURETIC PEPTIDE: CPT | Performed by: NURSE PRACTITIONER

## 2020-11-08 PROCEDURE — 82803 BLOOD GASES ANY COMBINATION: CPT

## 2020-11-08 PROCEDURE — 83605 ASSAY OF LACTIC ACID: CPT | Performed by: NURSE PRACTITIONER

## 2020-11-08 PROCEDURE — 83615 LACTATE (LD) (LDH) ENZYME: CPT | Performed by: NURSE PRACTITIONER

## 2020-11-08 PROCEDURE — 86900 BLOOD TYPING SEROLOGIC ABO: CPT | Performed by: NURSE PRACTITIONER

## 2020-11-08 RX ORDER — NICOTINE POLACRILEX 4 MG/1
20 GUM, CHEWING ORAL DAILY
COMMUNITY

## 2020-11-08 RX ORDER — AMOXICILLIN AND CLAVULANATE POTASSIUM 875; 125 MG/1; MG/1
1 TABLET, FILM COATED ORAL 2 TIMES DAILY
COMMUNITY
End: 2022-03-31

## 2020-11-08 RX ORDER — DOXYCYCLINE HYCLATE 100 MG/1
100 CAPSULE ORAL 2 TIMES DAILY
COMMUNITY
End: 2022-09-23

## 2020-11-08 RX ORDER — SODIUM CHLORIDE 0.9 % (FLUSH) 0.9 %
10 SYRINGE (ML) INJECTION AS NEEDED
Status: DISCONTINUED | OUTPATIENT
Start: 2020-11-08 | End: 2020-11-08 | Stop reason: HOSPADM

## 2020-11-08 RX ADMIN — SODIUM CHLORIDE 500 ML: 9 INJECTION, SOLUTION INTRAVENOUS at 13:40

## 2020-11-08 NOTE — ED PROVIDER NOTES
Subjective   Patient is a 60-year-old male that presents here today with complaint of Covid positive test and worsening Covid symptoms.  The patient reports that on October 26, 2020 he began to have symptoms of Covid.  He states that he tested positive at that time.  The patient states that he was started on a Medrol Dosepak and Zithromax at that time.  He states that he continued to have increased weakness and shortness of breath.  He was seen at this facility on November 4, 2020.  Patient had an extensive work-up at that time. CTA chest showed:     IMPRESSION:  1. No evidence of pulmonary embolus.  2. There are bilateral peripheral and dependent predominant groundglass  infiltrates in both lungs, suspicious for atypical pneumonia. No  consolidation.       Patient's case was discussed with the hospitalist that time who declined admission.  The patient was discharged home with a prescription for Zithromax and Decadron.  The patient states that he contacted his primary care provider in [place], [place].  He states that his primary care provider stopped the Zithromax and Decadron and started the patient on Augmentin and doxycycline.  He states he has been taking the medications as prescribed.  He states that today shortness of breath is worse.  States that he still continues to have a nonproductive cough and feels weak.  The patient states that on Friday evening he had a fever of 103.5 at home.  He denies any chest pain.  The patient states that he has had no nausea or vomiting.  The patient states that he has been checking his pulse symmetry at home and that it was at 90% and below earlier today so he decided to come to the ER for further evaluation.  He states that his wife is also at home and is also Covid positive.      History provided by:  Patient   used: No    Shortness of Breath  Severity:  Moderate  Onset quality:  Sudden  Duration:  12 days  Timing:  Constant  Progression:   Worsening  Chronicity:  New  Context: not activity, not animal exposure, not emotional upset, not fumes, not known allergens, not occupational exposure, not pollens, not smoke exposure, not strong odors, not URI and not weather changes    Relieved by:  Nothing  Worsened by:  Nothing  Ineffective treatments:  None tried  Associated symptoms: cough    Associated symptoms: no abdominal pain, no chest pain, no claudication, no diaphoresis, no ear pain, no fever, no headaches, no hemoptysis, no neck pain, no PND, no rash, no sore throat, no sputum production, no syncope, no swollen glands, no vomiting and no wheezing    Risk factors: no recent alcohol use, no family hx of DVT, no hx of cancer, no hx of PE/DVT, no obesity, no oral contraceptive use, no prolonged immobilization, no recent surgery and no tobacco use        Review of Systems   Constitutional: Negative for diaphoresis and fever.   HENT: Negative for ear pain and sore throat.    Respiratory: Positive for cough and shortness of breath. Negative for hemoptysis, sputum production and wheezing.    Cardiovascular: Negative for chest pain, claudication, syncope and PND.   Gastrointestinal: Negative for abdominal pain and vomiting.   Musculoskeletal: Negative for neck pain.   Skin: Negative for rash.   Neurological: Negative for headaches.   All other systems reviewed and are negative.      Past Medical History:   Diagnosis Date   • Abnormal ECG    • BPH (benign prostatic hyperplasia)    • COVID-19    • GERD (gastroesophageal reflux disease)    • Vertigo        No Known Allergies    History reviewed. No pertinent surgical history.    Family History   Problem Relation Age of Onset   • Cancer Father         kidney, bone cancer   • Coronary artery disease Neg Hx        Social History     Socioeconomic History   • Marital status:      Spouse name: Not on file   • Number of children: Not on file   • Years of education: Not on file   • Highest education level: Not  on file   Tobacco Use   • Smoking status: Never Smoker   • Smokeless tobacco: Never Used   Substance and Sexual Activity   • Alcohol use: No     Frequency: Never   • Drug use: No   • Sexual activity: Defer           Objective   Physical Exam  Vitals signs and nursing note reviewed.   Constitutional:       Appearance: Normal appearance.   HENT:      Head: Normocephalic and atraumatic.   Eyes:      Conjunctiva/sclera: Conjunctivae normal.   Cardiovascular:      Rate and Rhythm: Normal rate and regular rhythm.   Pulmonary:      Effort: Pulmonary effort is normal.      Breath sounds: Examination of the right-lower field reveals decreased breath sounds. Examination of the left-lower field reveals decreased breath sounds. Decreased breath sounds present.   Abdominal:      General: Bowel sounds are normal.      Palpations: Abdomen is soft.   Skin:     General: Skin is warm and dry.      Capillary Refill: Capillary refill takes less than 2 seconds.   Neurological:      General: No focal deficit present.      Mental Status: He is alert.   Psychiatric:         Mood and Affect: Mood normal.         Procedures           ED Course  ED Course as of Nov 10 0049   Tue Nov 10, 2020   0048 The patient's labs today are actually better than what they were when he was here several days ago.  He did do an ambulatory pulse oximetry while walking in his room and this stayed at 93% on room air.    [LF]   0048 At this time the patient will be discharged home in stable condition.  He is advised to continue to use the pulse oximeter that he has at home to check his pulse ox.  If it falls to 90 or below he should come in to be evaluated.  Patient also is on antibiotics advised him to continue on these medications.  At this time he will be discharged home in stable condition advised to keep close follow-up his primary care provider.    [LF]      ED Course User Index  [LF] Alpa Coombs, APRN                                   XR Chest AP    Final Result   . Patchy peripheral right upper lobe infiltrate.   This report was finalized on 11/08/2020 13:48 by Dr. Naveen Chowdary MD.        Labs Reviewed   RESPIRATORY PANEL PCR W/ COVID-19 (SARS-COV-2) MAYKEL/IMELDA/BILLY/PAD/[facility]SY IN-HOUSE, NP SWAB IN UTM/VTP, 3-4 HR TAT - Abnormal; Notable for the following components:       Result Value    COVID19 Detected (*)     All other components within normal limits    Narrative:     Fact sheet for providers: https://docs.[employer]/wp-content/uploads/ZQP1789-6943-BJ7.1-EUA-Provider-Fact-Sheet-3.pdf    Fact sheet for patients: https://docs.[employer]/wp-content/uploads/ASQ1904-1845-GF0.1-EUA-Patient-Fact-Sheet-1.pdf   COMPREHENSIVE METABOLIC PANEL - Abnormal; Notable for the following components:    Glucose 136 (*)     All other components within normal limits    Narrative:     GFR Normal >60  Chronic Kidney Disease <60  Kidney Failure <15     LACTATE DEHYDROGENASE - Abnormal; Notable for the following components:     (*)     All other components within normal limits   FERRITIN - Abnormal; Notable for the following components:    Ferritin 659.00 (*)     All other components within normal limits    Narrative:     Results may be falsely decreased if patient taking Biotin.     C-REACTIVE PROTEIN - Abnormal; Notable for the following components:    C-Reactive Protein 10.12 (*)     All other components within normal limits   CBC WITH AUTO DIFFERENTIAL - Abnormal; Notable for the following components:    RDW 12.1 (*)     RDW-SD 34.8 (*)     Neutrophil % 80.6 (*)     Lymphocyte % 12.1 (*)     All other components within normal limits   BLOOD GAS, ARTERIAL - Abnormal; Notable for the following components:    pCO2, Arterial 34.6 (*)     pO2, Arterial 63.3 (*)     pCO2, Temperature Corrected 34.6 (*)     pO2, Temperature Corrected 63.3 (*)     All other components within normal limits   STREP PNEUMO AG, URINE OR CSF - Normal   BNP (IN-HOUSE) - Normal     "Narrative:     Among patients with dyspnea, NT-proBNP is highly sensitive for the detection of acute congestive heart failure. In addition NT-proBNP of <300 pg/ml effectively rules out acute congestive heart failure with 99% negative predictive value.    Results may be falsely decreased if patient taking Biotin.     TROPONIN (IN-HOUSE) - Normal    Narrative:     Troponin T Reference Range:  <= 0.03 ng/mL-   Negative for AMI  >0.03 ng/mL-     Abnormal for myocardial necrosis.  Clinicians would have to utilize clinical acumen, EKG, Troponin and serial changes to determine if it is an Acute Myocardial Infarction or myocardial injury due to an underlying chronic condition.       Results may be falsely decreased if patient taking Biotin.     LACTIC ACID, PLASMA - Normal   PROCALCITONIN - Normal    Narrative:     As a Marker for Sepsis (Non-Neonates):   1. <0.5 ng/mL represents a low risk of severe sepsis and/or septic shock.  1. >2 ng/mL represents a high risk of severe sepsis and/or septic shock.    As a Marker for Lower Respiratory Tract Infections that require antibiotic therapy:  PCT on Admission     Antibiotic Therapy             6-12 Hrs later  > 0.5                Strongly Recommended            >0.25 - <0.5         Recommended  0.1 - 0.25           Discouraged                   Remeasure/reassess PCT  <0.1                 Strongly Discouraged          Remeasure/reassess PCT      As 28 day mortality risk marker: \"Change in Procalcitonin Result\" (> 80 % or <=80 %) if Day 0 (or Day 1) and Day 4 values are available. Refer to http://www.[employer]-pct-calculator.com/   Change in PCT <=80 %   A decrease of PCT levels below or equal to 80 % defines a positive change in PCT test result representing a higher risk for 28-day all-cause mortality of patients diagnosed with severe sepsis or septic shock.  Change in PCT > 80 %   A decrease of PCT levels of more than 80 % defines a negative change in PCT result representing a " lower risk for 28-day all-cause mortality of patients diagnosed with severe sepsis or septic shock.                Results may be falsely decreased if patient taking Biotin.    BLOOD CULTURE WITH NAN   BLOOD CULTURE WITH NAN   COVID PRE-OP / PRE-PROCEDURE SCREENING ORDER (NO ISOLATION)    Narrative:     The following orders were created for panel order COVID PRE-OP / PRE-PROCEDURE SCREENING ORDER (NO ISOLATION) - Swab, Nasopharynx.  Procedure                               Abnormality         Status                     ---------                               -----------         ------                     Respiratory Panel PCR w/...[[id number]]  Abnormal            Final result                 Please view results for these tests on the individual orders.   RAINBOW DRAW    Narrative:     The following orders were created for panel order Star Draw.  Procedure                               Abnormality         Status                     ---------                               -----------         ------                     Light Blue Top[[id number]]                                   Final result               Green Top (Gel)[[id number]]                                  Final result               Lavender Top[[id number]]                                     Final result               Red Top[[id number]]                                          Final result               Star Blood Culture Bossman...[[id number]]                      Final result               Gray Top - Ice[[id number]]                                   Final result                 Please view results for these tests on the individual orders.   GRAY TOP - ICE   BLOOD GAS, ARTERIAL   TYPE AND SCREEN   LIGHT BLUE TOP   GREEN TOP   LAVENDER TOP   RED TOP   RAINBOW BLOOD CULTURE BOTTLES - 1 SET   CBC AND DIFFERENTIAL    Narrative:     The following orders were created for panel order CBC & Differential.  Procedure                               Abnormality         Status                      ---------                               -----------         ------                     CBC Auto Differential[[id number]]        Abnormal            Final result                 Please view results for these tests on the individual orders.             MDM  Number of Diagnoses or Management Options  COVID-19: new and requires workup     Amount and/or Complexity of Data Reviewed  Clinical lab tests: reviewed and ordered  Tests in the radiology section of CPT®: reviewed and ordered  Tests in the medicine section of CPT®: ordered and reviewed  Decide to obtain previous medical records or to obtain history from someone other than the patient: yes  Discuss the patient with other providers: yes    Patient Progress  Patient progress: stable      Final diagnoses:   COVID-19            Alpa Coombs, APRN  11/10/20 0049

## 2020-11-08 NOTE — ED NOTES
Pulse ox during ambulation around the room stayed at 93%     Dickson Velázquez, RN  11/08/20 1388

## 2020-11-09 ENCOUNTER — EPISODE CHANGES (OUTPATIENT)
Dept: CASE MANAGEMENT | Facility: OTHER | Age: 60
End: 2020-11-09

## 2020-11-09 ENCOUNTER — PATIENT OUTREACH (OUTPATIENT)
Dept: CASE MANAGEMENT | Facility: OTHER | Age: 60
End: 2020-11-09

## 2020-11-09 LAB
BACTERIA SPEC AEROBE CULT: NORMAL
BACTERIA SPEC AEROBE CULT: NORMAL
QT INTERVAL: 376 MS
QTC INTERVAL: 447 MS

## 2020-11-09 NOTE — OUTREACH NOTE
"ED Potential Covid Discharge Follow-up    Patient seen at [facility] ED 11-8-2020 for known COVID-19 diagnosis and SOB. ACM outreach with patient. He reports his \"weak and tired\". He feels overall \"a little better\" today. He is having some difficulty with swallowing thick foods \"some foods just burn and it just feels thick back there\". He is drinking fluids. His oxygen saturation is averaging 93%. He has 1 dose remaining of the doxycyline and Augmentin prescribed by his PCP in [place]. He has someone that can deliver supplies as needed to his home. Discussed 24/7 nurse triage line, COVID-19 hotline, and ACM contact information. ACM educated to return to ED with any worsening symptoms or oxygen saturation less than 90%.     Kalpana Allen RN  Ambulatory     11/9/2020, 14:31 CST      "

## 2020-11-13 LAB
BACTERIA SPEC AEROBE CULT: NORMAL
BACTERIA SPEC AEROBE CULT: NORMAL

## 2020-11-17 ENCOUNTER — EPISODE CHANGES (OUTPATIENT)
Dept: CASE MANAGEMENT | Facility: OTHER | Age: 60
End: 2020-11-17

## 2021-07-14 ENCOUNTER — TELEPHONE (OUTPATIENT)
Dept: UROLOGY | Age: 61
End: 2021-07-14

## 2021-07-14 NOTE — TELEPHONE ENCOUNTER
Patient requested Dr Marion Rosario for [facility] ([facility]) provider of choice - cancelling referral

## 2021-07-28 ENCOUNTER — TELEPHONE (OUTPATIENT)
Dept: UROLOGY | Facility: CLINIC | Age: 61
End: 2021-07-28

## 2021-08-05 ENCOUNTER — OFFICE VISIT (OUTPATIENT)
Dept: UROLOGY | Facility: CLINIC | Age: 61
End: 2021-08-05

## 2021-08-05 VITALS — BODY MASS INDEX: 28.04 KG/M2 | HEIGHT: 68 IN | TEMPERATURE: 97.6 F | WEIGHT: 185 LBS

## 2021-08-05 DIAGNOSIS — N40.1 BPH WITH OBSTRUCTION/LOWER URINARY TRACT SYMPTOMS: Primary | ICD-10-CM

## 2021-08-05 DIAGNOSIS — N13.8 BPH WITH OBSTRUCTION/LOWER URINARY TRACT SYMPTOMS: Primary | ICD-10-CM

## 2021-08-05 LAB
BILIRUB BLD-MCNC: NEGATIVE MG/DL
CLARITY, POC: CLEAR
COLOR UR: YELLOW
GLUCOSE UR STRIP-MCNC: NEGATIVE MG/DL
KETONES UR QL: NEGATIVE
LEUKOCYTE EST, POC: NEGATIVE
NITRITE UR-MCNC: NEGATIVE MG/ML
PH UR: 5.5 [PH] (ref 5–8)
PROT UR STRIP-MCNC: NEGATIVE MG/DL
RBC # UR STRIP: NEGATIVE /UL
SP GR UR: 1.02 (ref 1–1.03)
UROBILINOGEN UR QL: NORMAL

## 2021-08-05 PROCEDURE — 81003 URINALYSIS AUTO W/O SCOPE: CPT | Performed by: UROLOGY

## 2021-08-05 PROCEDURE — 51798 US URINE CAPACITY MEASURE: CPT | Performed by: UROLOGY

## 2021-08-05 PROCEDURE — 99203 OFFICE O/P NEW LOW 30 MIN: CPT | Performed by: UROLOGY

## 2021-08-05 RX ORDER — TROSPIUM CHLORIDE 20 MG/1
20 TABLET, FILM COATED ORAL
Qty: 60 TABLET | Refills: 11 | Status: SHIPPED | OUTPATIENT
Start: 2021-08-05 | End: 2022-03-31 | Stop reason: SINTOL

## 2022-03-29 ENCOUNTER — HOSPITAL ENCOUNTER (EMERGENCY)
Facility: HOSPITAL | Age: 62
Discharge: HOME | End: 2022-03-29
Attending: INTERNAL MEDICINE | Admitting: INTERNAL MEDICINE

## 2022-03-29 VITALS
BODY MASS INDEX: 27.58 KG/M2 | TEMPERATURE: 98.1 F | WEIGHT: 182 LBS | OXYGEN SATURATION: 95 % | DIASTOLIC BLOOD PRESSURE: 85 MMHG | RESPIRATION RATE: 16 BRPM | HEIGHT: 68 IN | HEART RATE: 120 BPM | SYSTOLIC BLOOD PRESSURE: 138 MMHG

## 2022-03-29 DIAGNOSIS — R33.8 ACUTE URINARY RETENTION: Primary | ICD-10-CM

## 2022-03-29 PROCEDURE — 99282 EMERGENCY DEPT VISIT SF MDM: CPT

## 2022-03-29 PROCEDURE — 51702 INSERT TEMP BLADDER CATH: CPT

## 2022-03-29 RX ORDER — TAMSULOSIN HYDROCHLORIDE 0.4 MG/1
1 CAPSULE ORAL DAILY
Qty: 30 CAPSULE | Refills: 0 | Status: SHIPPED | OUTPATIENT
Start: 2022-03-29 | End: 2022-03-31 | Stop reason: DRUGHIGH

## 2022-03-30 NOTE — PROGRESS NOTES
"Chief Complaint  Urinary retention    Subjective          Zion Ortiz presents to [facility] for follow-up of an episode of acute urinary retention.  Patient discontinued his trospium a few weeks after we had started it because he noted some slowing of his urinary stream.  He did restart his tamsulosin the day before yesterday when he began having symptoms.  We took him off finasteride last time he was here his prostate was not that enlarged by my exam.  He says this was a slow progression over the previous day.  He denies taking any new medications including over-the-counter medications.      Current Outpatient Medications:   •  omeprazole (priLOSEC) 40 MG capsule, Take 40 mg by mouth Daily., Disp: , Rfl:   •  dexlansoprazole (DEXILANT) 60 MG capsule, Take 60 mg by mouth Daily., Disp: , Rfl:   •  doxycycline (VIBRAMYCIN) 100 MG capsule, Take 100 mg by mouth 2 (Two) Times a Day., Disp: , Rfl:   •  finasteride (PROSCAR) 5 MG tablet, Take 1 tablet by mouth Daily for 360 days., Disp: 30 tablet, Rfl: 11  •  tamsulosin (FLOMAX) 0.4 MG capsule 24 hr capsule, Take 2 capsules by mouth Every Night for 360 days., Disp: 60 capsule, Rfl: 11  Past Medical History:   Diagnosis Date   • Abnormal ECG    • BPH (benign prostatic hyperplasia)    • COVID-19    • GERD (gastroesophageal reflux disease)    • Vertigo      History reviewed. No pertinent surgical history.        Review  of systems  Constitutional: Negative for chills or fever.   Gastrointestinal: Negative for abdominal pain, anal bleeding or blood in stool.           Objective   PHYSICAL EXAM  Vital Signs:   Temp 98.1 °F (36.7 °C)   Ht 172.7 cm (68\")   Wt 82.6 kg (182 lb)   BMI 27.67 kg/m²     Constitutional: Patient is without distress or deformity.  Vital signs are reviewed as above.    Neuro: No confusion; No disorientation; Alert and oriented  Pulmonary: No respiratory distress.   Skin: No pallor or diaphoresis      DATA  Result Review : "                       ASSESSMENT AND PLAN          Problem List Items Addressed This Visit    None     Visit Diagnoses     BPH with obstruction/lower urinary tract symptoms    -  Primary    Relevant Medications    tamsulosin (FLOMAX) 0.4 MG capsule 24 hr capsule    finasteride (PROSCAR) 5 MG tablet    Urinary retention        Relevant Medications    tamsulosin (FLOMAX) 0.4 MG capsule 24 hr capsule    finasteride (PROSCAR) 5 MG tablet      This would be considered a severe exacerbation (urinary retention) of a chronic problem (BPH with lower urinary tract symptoms)    -I would like to continue his tamsulosin.  I am to leave him on a 0.4 mg dose on a daily basis.    - Cystoscopy as the definitive lower urinary tract study is discussed . The risks of pain and discomfort, infection, and urethral stricture are discussed with the patient including the technique used in the office setting.  All patient questions were answered.     -  I think he has to stay off of anything that might decrease contractility bladder.  Would also like him to learn how to do intermittent catheterization just in case this happened again.  I explained to him that approximately 30% of patients have an episode of acute urinary retention without obvious etiology will require some type of surgical relief with her outlet obstruction.          FOLLOW UP     Return in about 3 weeks (around 4/21/2022) for cystoscopy.        (Please note that portions of this note were completed with a voice recognition program.)  Bernardino Fenton MD  03/31/22  12:23 CDT

## 2022-03-31 ENCOUNTER — OFFICE VISIT (OUTPATIENT)
Dept: UROLOGY | Facility: CLINIC | Age: 62
End: 2022-03-31

## 2022-03-31 VITALS — BODY MASS INDEX: 27.58 KG/M2 | TEMPERATURE: 98.1 F | HEIGHT: 68 IN | WEIGHT: 182 LBS

## 2022-03-31 DIAGNOSIS — R33.9 URINARY RETENTION: ICD-10-CM

## 2022-03-31 DIAGNOSIS — N13.8 BPH WITH OBSTRUCTION/LOWER URINARY TRACT SYMPTOMS: Primary | ICD-10-CM

## 2022-03-31 DIAGNOSIS — N40.1 BPH WITH OBSTRUCTION/LOWER URINARY TRACT SYMPTOMS: Primary | ICD-10-CM

## 2022-03-31 PROCEDURE — 99214 OFFICE O/P EST MOD 30 MIN: CPT | Performed by: UROLOGY

## 2022-03-31 RX ORDER — TAMSULOSIN HYDROCHLORIDE 0.4 MG/1
2 CAPSULE ORAL NIGHTLY
Qty: 60 CAPSULE | Refills: 11 | Status: SHIPPED | OUTPATIENT
Start: 2022-03-31 | End: 2023-02-27

## 2022-03-31 RX ORDER — FINASTERIDE 5 MG/1
5 TABLET, FILM COATED ORAL DAILY
Qty: 30 TABLET | Refills: 11 | Status: SHIPPED | OUTPATIENT
Start: 2022-03-31 | End: 2023-02-27

## 2022-04-20 NOTE — PROGRESS NOTES
CC: I am here for the doctor to look at my bladder    Cystoscopy procedure note  Pre- operative diagnosis:  Benign prostatic hypertrophy and Urinary retention    Post operative diagnosis:  BPH  Retention resolved    Procedure:  The patient was prepped and draped in a normal sterile fashion.  The urethra was anesthetized with 2% lidocaine jelly.  A flexible cystoscope was introduced per urethra.      The anterior urethra is without evidence of stricture.. The prostatic urethra shows lateral lobe enlargement with a LARGE INTRAVESICAL PORTION of the prostate which would have to be considered SHOULD HE UNDERGO UroLift.  He does not have any evidence of a median lobe.  This does appear obstructive.. The bladder is moderately trabeculated without urothelial lesion.  Orifices orthotopic position normal in their configuration..     Patient tolerated the procedure well    Complications: none    Blood loss: minimal    Diagnoses and all orders for this visit:    1. BPH with obstruction/lower urinary tract symptoms (Primary)  -     POC Urinalysis Dipstick, Multipro              Follow up:    Routine follow up    Bernardino Fenton MD  4/20/2022  12:57 CDT

## 2022-04-21 ENCOUNTER — PROCEDURE VISIT (OUTPATIENT)
Dept: UROLOGY | Facility: CLINIC | Age: 62
End: 2022-04-21

## 2022-04-21 DIAGNOSIS — N40.1 BPH WITH OBSTRUCTION/LOWER URINARY TRACT SYMPTOMS: Primary | ICD-10-CM

## 2022-04-21 DIAGNOSIS — N13.8 BPH WITH OBSTRUCTION/LOWER URINARY TRACT SYMPTOMS: Primary | ICD-10-CM

## 2022-04-21 LAB
BILIRUB BLD-MCNC: NEGATIVE MG/DL
CLARITY, POC: CLEAR
COLOR UR: YELLOW
GLUCOSE UR STRIP-MCNC: NEGATIVE MG/DL
KETONES UR QL: NEGATIVE
LEUKOCYTE EST, POC: NEGATIVE
NITRITE UR-MCNC: NEGATIVE MG/ML
PH UR: 7 [PH] (ref 5–8)
PROT UR STRIP-MCNC: ABNORMAL MG/DL
RBC # UR STRIP: NEGATIVE /UL
SP GR UR: 1.02 (ref 1–1.03)
UROBILINOGEN UR QL: NORMAL

## 2022-04-21 PROCEDURE — 52000 CYSTOURETHROSCOPY: CPT | Performed by: UROLOGY

## 2022-04-21 PROCEDURE — 81001 URINALYSIS AUTO W/SCOPE: CPT | Performed by: UROLOGY

## 2022-09-23 ENCOUNTER — OFFICE VISIT (OUTPATIENT)
Dept: CARDIOLOGY | Facility: CLINIC | Age: 62
End: 2022-09-23

## 2022-09-23 VITALS
BODY MASS INDEX: 28.04 KG/M2 | OXYGEN SATURATION: 98 % | HEIGHT: 68 IN | DIASTOLIC BLOOD PRESSURE: 68 MMHG | WEIGHT: 185 LBS | SYSTOLIC BLOOD PRESSURE: 114 MMHG | HEART RATE: 97 BPM

## 2022-09-23 DIAGNOSIS — R07.89 ATYPICAL CHEST PAIN: Primary | ICD-10-CM

## 2022-09-23 DIAGNOSIS — R06.02 SOB (SHORTNESS OF BREATH): ICD-10-CM

## 2022-09-23 PROCEDURE — 93000 ELECTROCARDIOGRAM COMPLETE: CPT | Performed by: INTERNAL MEDICINE

## 2022-09-23 PROCEDURE — 99204 OFFICE O/P NEW MOD 45 MIN: CPT | Performed by: INTERNAL MEDICINE

## 2022-09-23 NOTE — PROGRESS NOTES
Subjective:     Encounter Date:09/23/2022      Patient ID: Zion Ortiz is a 62 y.o. male who is referred here for further evaluation of chest discomfort, shortness of breath.    Referring provider: Juan Parkinson DO  Reason for referral: Chest pain    Chief Complaint: Intermittent chest discomfort, shortness of breath    History of Present Illness    This is a 62-year-old male with no significant cardiac history, referred here for further evaluation of intermittent chest discomfort, shortness of breath.  The patient notes that he had COVID in 2020 and noticed some shortness of breath afterwards, however this seemingly had improved quite significantly.  However over the past 6 weeks, the patient has noted a recurrence of shortness of breath, dyspnea on exertion.  In addition, he has noticed some intermittent chest discomfort.  He describes the chest discomfort as occurring intermittently without provoking factors, usually in the lateral left portion of the chest, mild in severity, tightness, nonradiating from the chest.  He notes associated shortness of breath and dyspnea on exertion.  He notes that pain can last variable lengths of time from minutes, to hours, the even days.  He denies having any significant exertional chest pain, however.  He notes some intermittent coughing but no wheezing.  No orthopnea, PND, edema.  No palpitations, lightheadedness, dizziness or syncope.  He was evaluated here over 3 years ago and had a low risk treadmill stress test at that time.    Chest Pain   This is a new problem. The current episode started more than 1 month ago. The onset quality is gradual. The problem occurs intermittently. The problem has been waxing and waning. The pain is present in the lateral region. The pain is mild. The quality of the pain is described as tightness. Associated symptoms include a cough and shortness of breath. Pertinent negatives include no abdominal pain, dizziness, fever, headaches,  nausea, numbness, orthopnea, palpitations, PND, syncope or vomiting. The pain is aggravated by nothing. He has tried nothing for the symptoms.      The following portions of the patient's history were reviewed and updated as appropriate: allergies, current medications, past family history, past medical history, past social history, past surgical history and problem list.     Past Medical History:   Diagnosis Date   • Abnormal ECG    • BPH (benign prostatic hyperplasia)    • COVID-19    • GERD (gastroesophageal reflux disease)    • Vertigo      History reviewed. No pertinent surgical history.    Current Outpatient Medications:   •  finasteride (PROSCAR) 5 MG tablet, Take 1 tablet by mouth Daily for 360 days., Disp: 30 tablet, Rfl: 11  •  Omeprazole 20 MG tablet delayed-release, Take 20 mg by mouth Daily., Disp: , Rfl:   •  tamsulosin (FLOMAX) 0.4 MG capsule 24 hr capsule, Take 2 capsules by mouth Every Night for 360 days., Disp: 60 capsule, Rfl: 11    No Known Allergies    Social History     Tobacco Use   • Smoking status: Never Smoker   • Smokeless tobacco: Never Used   Substance Use Topics   • Alcohol use: No     Family History   Problem Relation Age of Onset   • Cancer Father         kidney, bone cancer   • Coronary artery disease Neg Hx      Review of Systems   Constitutional: Negative for chills, fever and weight loss.   HENT: Negative for congestion and hearing loss.    Eyes: Negative for blurred vision and pain.   Cardiovascular: Positive for chest pain and dyspnea on exertion. Negative for leg swelling, orthopnea, palpitations, paroxysmal nocturnal dyspnea and syncope.   Respiratory: Positive for cough and shortness of breath. Negative for wheezing.    Endocrine: Negative for cold intolerance and heat intolerance.   Hematologic/Lymphatic: Negative for adenopathy and bleeding problem.   Skin: Negative for color change, poor wound healing and rash.   Musculoskeletal: Negative for myalgias and neck pain.    Gastrointestinal: Negative for abdominal pain, nausea and vomiting.   Genitourinary: Negative for dysuria and frequency.   Neurological: Positive for light-headedness (rarely). Negative for dizziness, headaches, loss of balance and numbness.   Psychiatric/Behavioral: Negative for altered mental status and memory loss.   Allergic/Immunologic: Negative for hives and persistent infections.       ECG 12 Lead    Date/Time: 9/23/2022 9:18 AM  Performed by: Regan Mello MD  Authorized by: Regan Mello MD   Comparison: compared with previous ECG from 9/9/2022  Similar to previous ECG  Comparison to previous ECG: Slight decrease in QRS duration compared to previous  Rhythm: sinus rhythm  Rate: normal  BPM: 79  Conduction: incomplete right bundle branch block  QRS axis: left    Clinical impression: abnormal EKG             Objective:     Vitals reviewed.   Constitutional:       General: Not in acute distress.     Appearance: Normal and healthy appearance. Well-developed. Not toxic-appearing or diaphoretic.   Eyes:      General: Lids are normal.      Extraocular Movements: Extraocular movements intact.      Pupils: Pupils are equal, round, and reactive to light.   HENT:      Head: Normocephalic and atraumatic.      Right Ear: External ear normal.      Left Ear: External ear normal.      Nose: Nose normal.    Mouth/Throat:      Mouth: Mucous membranes are not pale, not dry and not cyanotic.   Neck:      Thyroid: No thyroid mass or thyromegaly.      Vascular: No carotid bruit, hepatojugular reflux or JVD.      Trachea: No tracheal deviation.      Lymphadenopathy: No cervical adenopathy.   Pulmonary:      Effort: Pulmonary effort is normal. No accessory muscle usage or respiratory distress.      Breath sounds: Normal breath sounds. No wheezing. No rhonchi. No rales.   Chest:      Chest wall: Not tender to palpatation.   Cardiovascular:      Normal rate. Regular rhythm.      Murmurs: There is no murmur.      " No gallop.   Pulses:     Intact distal pulses.   Edema:     Peripheral edema absent.   Abdominal:      General: Bowel sounds are normal. There is no distension or abdominal bruit.      Palpations: Abdomen is soft.      Tenderness: There is no abdominal tenderness.   Musculoskeletal: Normal range of motion.         General: No tenderness or deformity.      Extremities: No clubbing present.     Cervical back: Normal range of motion and neck supple. No edema. Skin:     General: Skin is warm and dry. There is no cyanosis.      Findings: No erythema or rash.   Neurological:      General: No focal deficit present.      Mental Status: Oriented to person, place, and time and oriented to person, place and time.      Cranial Nerves: No cranial nerve deficit.   Psychiatric:         Attention and Perception: Attention normal.         Mood and Affect: Mood normal.         Speech: Speech normal.         Behavior: Behavior normal. Behavior is cooperative.         Thought Content: Thought content normal.       /68   Pulse 97   Ht 172.7 cm (68\")   Wt 83.9 kg (185 lb)   SpO2 98%   BMI 28.13 kg/m²     Data/Lab Review:     Treadmill stress test on 6/6/2019: Overall, low Duke treadmill score of +5.  No clinically significant ST changes noted with stress.  Good exercise tolerance of 9 minutes.  Estimated workload 10 METS.      Assessment:          Diagnosis Plan   1. Atypical chest pain  Treadmill Stress Test    ECG 12 Lead   2. SOB (shortness of breath)  Treadmill Stress Test          Plan:       1.  Atypical chest pain: The patient describes chest discomfort with atypical features.  He also describes shortness of breath and dyspnea on exertion.  The symptoms raise concern for possible cardiac ischemia.  He recently had labs checked and the labs were fairly unremarkable.  We will order a treadmill stress test to further evaluate.    2.  Shortness of breath: As noted above, with the patient's report of chest discomfort, we " will consider that the symptoms could represent angina therefore will investigate further with a treadmill stress test.    Follow-up will be pending the results of the patient's upcoming stress test.

## 2022-09-26 ENCOUNTER — HOSPITAL ENCOUNTER (OUTPATIENT)
Dept: CARDIOLOGY | Facility: HOSPITAL | Age: 62
Discharge: HOME | End: 2022-09-26
Admitting: INTERNAL MEDICINE

## 2022-09-26 VITALS
BODY MASS INDEX: 27.28 KG/M2 | HEART RATE: 85 BPM | SYSTOLIC BLOOD PRESSURE: 143 MMHG | DIASTOLIC BLOOD PRESSURE: 95 MMHG | HEIGHT: 68 IN | WEIGHT: 180 LBS

## 2022-09-26 DIAGNOSIS — R06.02 SOB (SHORTNESS OF BREATH): ICD-10-CM

## 2022-09-26 DIAGNOSIS — R07.89 ATYPICAL CHEST PAIN: ICD-10-CM

## 2022-09-26 PROCEDURE — 93018 CV STRESS TEST I&R ONLY: CPT | Performed by: INTERNAL MEDICINE

## 2022-09-26 PROCEDURE — 93017 CV STRESS TEST TRACING ONLY: CPT

## 2022-09-27 LAB
BH CV STRESS BP STAGE 1: NORMAL
BH CV STRESS BP STAGE 2: NORMAL
BH CV STRESS DURATION MIN STAGE 1: 3
BH CV STRESS DURATION MIN STAGE 2: 3
BH CV STRESS DURATION SEC STAGE 1: 0
BH CV STRESS DURATION SEC STAGE 2: 0
BH CV STRESS GRADE STAGE 1: 10
BH CV STRESS GRADE STAGE 2: 12
BH CV STRESS HR STAGE 1: 116
BH CV STRESS HR STAGE 2: 134
BH CV STRESS METS STAGE 1: 5
BH CV STRESS METS STAGE 2: 7.5
BH CV STRESS PROTOCOL 1: NORMAL
BH CV STRESS RECOVERY BP: NORMAL MMHG
BH CV STRESS RECOVERY HR: 104 BPM
BH CV STRESS SPEED STAGE 1: 1.7
BH CV STRESS SPEED STAGE 2: 2.5
BH CV STRESS STAGE 1: 1
BH CV STRESS STAGE 2: 2
MAXIMAL PREDICTED HEART RATE: 157 BPM
PERCENT MAX PREDICTED HR: 85.35 %
STRESS BASELINE BP: NORMAL MMHG
STRESS BASELINE HR: 80 BPM
STRESS PERCENT HR: 100 %
STRESS POST ESTIMATED WORKLOAD: 7.5 METS
STRESS POST EXERCISE DUR MIN: 6 MIN
STRESS POST EXERCISE DUR SEC: 0 SEC
STRESS POST PEAK BP: NORMAL MMHG
STRESS POST PEAK HR: 134 BPM
STRESS TARGET HR: 133 BPM

## 2023-02-27 DIAGNOSIS — N40.1 BPH WITH OBSTRUCTION/LOWER URINARY TRACT SYMPTOMS: ICD-10-CM

## 2023-02-27 DIAGNOSIS — N13.8 BPH WITH OBSTRUCTION/LOWER URINARY TRACT SYMPTOMS: ICD-10-CM

## 2023-02-27 RX ORDER — TAMSULOSIN HYDROCHLORIDE 0.4 MG/1
CAPSULE ORAL
Qty: 60 CAPSULE | Refills: 11 | Status: SHIPPED | OUTPATIENT
Start: 2023-02-27 | End: 2023-04-04 | Stop reason: SDUPTHER

## 2023-02-27 RX ORDER — FINASTERIDE 5 MG/1
TABLET, FILM COATED ORAL
Qty: 30 TABLET | Refills: 11 | Status: SHIPPED | OUTPATIENT
Start: 2023-02-27 | End: 2023-04-04 | Stop reason: SDUPTHER

## 2023-04-03 NOTE — PROGRESS NOTES
"Chief Complaint  BPH    Subjective          Zion Ortiz presents to [facility] [place]   Patient BPH lower urinary tract symptoms  He is on finasteride and tamsulosin  He did have an episode urinary retention which resolved.    Current Outpatient Medications:   •  finasteride (PROSCAR) 5 MG tablet, Take 1 tablet by mouth Daily., Disp: 90 tablet, Rfl: 3  •  Omeprazole 20 MG tablet delayed-release, Take 20 mg by mouth Daily., Disp: , Rfl:   •  tamsulosin (FLOMAX) 0.4 MG capsule 24 hr capsule, Take 2 capsules by mouth Daily., Disp: 180 capsule, Rfl: 3  Past Medical History:   Diagnosis Date   • Abnormal ECG    • BPH (benign prostatic hyperplasia)    • COVID-19    • GERD (gastroesophageal reflux disease)    • Vertigo      History reviewed. No pertinent surgical history.    Review of Systems  Review  of systems  Constitutional: Negative for chills and fever.   Gastrointestinal: Negative for abdominal pain, anal bleeding and blood in stool.   Genitourinary: Negative for flank pain and hematuria.      Objective   PHYSICAL EXAM  Vital Signs:   Temp 97 °F (36.1 °C)   Ht 172.7 cm (68\")   Wt 88.1 kg (194 lb 3.2 oz)   BMI 29.53 kg/m²     Physical Exam  Constitutional: Patient is without distress or deformity.  Vital signs are reviewed as above.    Neuro: No confusion; No disorientation; Alert and oriented  Pulmonary: No respiratory distress.   Skin: No pallor or diaphoresis        DATA  Result Review :              Results for orders placed or performed in visit on 04/04/23   POC Urinalysis Dipstick    Specimen: Urine   Result Value Ref Range    Color Yellow Yellow, Straw, Dark Yellow, Juliane    Clarity, UA Clear Clear    Glucose, UA Negative Negative mg/dL    Bilirubin Negative Negative    Ketones, UA Negative Negative    Specific Gravity  1.025 1.005 - 1.030    Blood, UA Negative (NEGATIVE) Negative    pH, Urine 5.5 5.0 - 8.0    Protein, POC Negative (NEGATIVE) Negative mg/dL    Urobilinogen, UA " Normal Normal, 0.2 E.U./dL    Leukocytes Trace (A) Negative    Nitrite, UA Negative Negative     Bladder Scan interpretation  Estimation of residual urine via abdominal ultrasound  Residual Urine: 216 ml  Indication: Retention  Position: Supine  Examination: Incremental scanning of the suprapubic area using 3 MHz transducer using copious amounts of acoustic gel.   Findings: An anechoic area was demonstrated which represented the bladder, with measurement of residual urine as noted. I inspected this myself. In that the residual urine was stable or insignificant, no treatment will be necessary at this time.          ASSESSMENT AND PLAN          Problem List Items Addressed This Visit    None  Visit Diagnoses     BPH with obstruction/lower urinary tract symptoms    -  Primary    Relevant Medications    tamsulosin (FLOMAX) 0.4 MG capsule 24 hr capsule    finasteride (PROSCAR) 5 MG tablet    Other Relevant Orders    POC Urinalysis Dipstick (Completed)      Will continue both tamsulosin (0.8mg max dose) and finasteride.   Still with moderate LUTS  Discussed TURP, Aquablation. He is interested in latter. Probably be fall 2023 before we are up and running with it.   Discuss at next visit.       FOLLOW UP     Return in about 6 months (around 10/4/2023).        (Please note that portions of this note were completed with a voice recognition program.)  Bernardino Fenton MD  04/04/23  15:12 CDT

## 2023-04-04 ENCOUNTER — OFFICE VISIT (OUTPATIENT)
Dept: UROLOGY | Facility: CLINIC | Age: 63
End: 2023-04-04
Payer: COMMERCIAL

## 2023-04-04 VITALS — WEIGHT: 194.2 LBS | TEMPERATURE: 97 F | BODY MASS INDEX: 29.43 KG/M2 | HEIGHT: 68 IN

## 2023-04-04 DIAGNOSIS — N40.1 BPH WITH OBSTRUCTION/LOWER URINARY TRACT SYMPTOMS: Primary | ICD-10-CM

## 2023-04-04 DIAGNOSIS — N13.8 BPH WITH OBSTRUCTION/LOWER URINARY TRACT SYMPTOMS: Primary | ICD-10-CM

## 2023-04-04 LAB
BILIRUB BLD-MCNC: NEGATIVE MG/DL
CLARITY, POC: CLEAR
COLOR UR: YELLOW
GLUCOSE UR STRIP-MCNC: NEGATIVE MG/DL
KETONES UR QL: NEGATIVE
LEUKOCYTE EST, POC: ABNORMAL
NITRITE UR-MCNC: NEGATIVE MG/ML
PH UR: 5.5 [PH] (ref 5–8)
PROT UR STRIP-MCNC: NEGATIVE MG/DL
RBC # UR STRIP: NEGATIVE /UL
SP GR UR: 1.02 (ref 1–1.03)
UROBILINOGEN UR QL: NORMAL

## 2023-04-04 PROCEDURE — 81002 URINALYSIS NONAUTO W/O SCOPE: CPT | Performed by: UROLOGY

## 2023-04-04 PROCEDURE — 99213 OFFICE O/P EST LOW 20 MIN: CPT | Performed by: UROLOGY

## 2023-04-04 RX ORDER — TAMSULOSIN HYDROCHLORIDE 0.4 MG/1
2 CAPSULE ORAL DAILY
Qty: 180 CAPSULE | Refills: 3 | Status: SHIPPED | OUTPATIENT
Start: 2023-04-04

## 2023-04-04 RX ORDER — FINASTERIDE 5 MG/1
5 TABLET, FILM COATED ORAL DAILY
Qty: 90 TABLET | Refills: 3 | Status: SHIPPED | OUTPATIENT
Start: 2023-04-04

## 2023-11-03 ENCOUNTER — TELEPHONE (OUTPATIENT)
Dept: UROLOGY | Facility: CLINIC | Age: 63
End: 2023-11-03
Payer: COMMERCIAL

## 2024-03-04 NOTE — PROGRESS NOTES
"Chief Complaint  BPH    Subjective          Zion Ortiz presents to [facility]   Patient with significant bladder outlet obstructive symptoms returns today for follow-up.  He is currently on tamsulosin and finasteride.  Continues to have moderate severity symptoms.  Bothered mostly by frequency and urgency.  Has had prior episode urinary retention.      Current Outpatient Medications:     finasteride (PROSCAR) 5 MG tablet, Take 1 tablet by mouth Daily., Disp: 90 tablet, Rfl: 3    Omeprazole 20 MG tablet delayed-release, Take 20 mg by mouth Daily., Disp: , Rfl:     tamsulosin (FLOMAX) 0.4 MG capsule 24 hr capsule, Take 2 capsules by mouth Daily., Disp: 180 capsule, Rfl: 3  Past Medical History:   Diagnosis Date    Abnormal ECG     BPH (benign prostatic hyperplasia)     COVID-19     GERD (gastroesophageal reflux disease)     Vertigo      History reviewed. No pertinent surgical history.        Review of Systems      Objective   PHYSICAL EXAM  Vital Signs:   Ht 170.2 cm (67\")   Wt 86.3 kg (190 lb 3.2 oz)   BMI 29.79 kg/m²     Physical Exam      DATA  Result Review :              Results for orders placed or performed in visit on 04/04/23   POC Urinalysis Dipstick    Specimen: Urine   Result Value Ref Range    Color Yellow Yellow, Straw, Dark Yellow, Juliane    Clarity, UA Clear Clear    Glucose, UA Negative Negative mg/dL    Bilirubin Negative Negative    Ketones, UA Negative Negative    Specific Gravity  1.025 1.005 - 1.030    Blood, UA Negative (NEGATIVE) Negative    pH, Urine 5.5 5.0 - 8.0    Protein, POC Negative (NEGATIVE) Negative mg/dL    Urobilinogen, UA Normal Normal, 0.2 E.U./dL    Leukocytes Trace (A) Negative    Nitrite, UA Negative Negative     IPSS Questionnaire (AUA-7):  Incomplete emptying  Over the past month, how often have you had a sensation of not emptying your bladder completely after you finished urinating?: About half the time (03/26/24 1343)  Frequency  Over the past " month, how often have you had to urinate again less than two hours after you finishied urinating ?: More than half the time (03/26/24 1343)  Intermittency  Over the past month, how often have you found you stopped and started again several time when you urinated ?: About half the time (03/26/24 1343)  Urgency  Over the last month, how often have you found it difficult  have you found it difficult to postpone urination ?: About half the time (03/26/24 1343)  Weak Stream  Over the past month, how often have you had a weak urinary stream ?: About half the time (03/26/24 1343)  Straining  Over the past month, how often have you had to push or strain to begin urination ?: About half the time (03/26/24 1343)  Nocturia  Over the past month, how many times did you most typically get up to urinate from the time you went to bed until the time you got up in the morning ?: 1 time (03/26/24 1343)  Quality of life due to urinary symptoms  If you were to spend the rest of your life with your urinary condition the way it is now, how would feel about that?: Mostly dissatisfied (03/26/24 1343)    Scores  Total IPSS Score: (!) 20 (03/26/24 1343)                         ASSESSMENT AND PLAN          Problem List Items Addressed This Visit    None  Visit Diagnoses       BPH with obstruction/lower urinary tract symptoms    -  Primary    Relevant Orders    POC Urinalysis Dipstick, Multipro        We talked about his options.  He is interested in aquablation.  We briefly discussed procedure cleaning some of the risks but at this time it is not a good part of the year for him to do this.  He would like to proceed with this in the winter.  I am going to see him back in a few months and get him scheduled.  He will need a transrectal ultrasound the prostate done to assess prostate size.        FOLLOW UP     Return in about 4 months (around 7/26/2024).        (Please note that portions of this note were completed with a voice recognition  program.)  Bernardino Fenton MD  03/26/24  22:59 CDT

## 2024-03-26 ENCOUNTER — OFFICE VISIT (OUTPATIENT)
Dept: UROLOGY | Facility: CLINIC | Age: 64
End: 2024-03-26
Payer: COMMERCIAL

## 2024-03-26 VITALS — HEIGHT: 67 IN | WEIGHT: 190.2 LBS | BODY MASS INDEX: 29.85 KG/M2

## 2024-03-26 DIAGNOSIS — N13.8 BPH WITH OBSTRUCTION/LOWER URINARY TRACT SYMPTOMS: Primary | ICD-10-CM

## 2024-03-26 DIAGNOSIS — N40.1 BPH WITH OBSTRUCTION/LOWER URINARY TRACT SYMPTOMS: Primary | ICD-10-CM

## 2024-03-26 PROCEDURE — 99213 OFFICE O/P EST LOW 20 MIN: CPT | Performed by: UROLOGY

## 2024-05-06 PROBLEM — H18.061: Status: ACTIVE | Noted: 2024-05-06

## 2024-08-14 ENCOUNTER — HOSPITAL ENCOUNTER (EMERGENCY)
Facility: HOSPITAL | Age: 64
Discharge: HOME | End: 2024-08-14
Payer: MEDICARE

## 2024-08-14 ENCOUNTER — APPOINTMENT (OUTPATIENT)
Dept: GENERAL RADIOLOGY | Facility: HOSPITAL | Age: 64
End: 2024-08-14
Payer: MEDICARE

## 2024-08-14 VITALS
WEIGHT: 187 LBS | SYSTOLIC BLOOD PRESSURE: 129 MMHG | OXYGEN SATURATION: 96 % | HEART RATE: 91 BPM | DIASTOLIC BLOOD PRESSURE: 94 MMHG | HEIGHT: 68 IN | BODY MASS INDEX: 28.34 KG/M2 | RESPIRATION RATE: 16 BRPM | TEMPERATURE: 97.9 F

## 2024-08-14 DIAGNOSIS — M25.561 ACUTE PAIN OF RIGHT KNEE: Primary | ICD-10-CM

## 2024-08-14 PROCEDURE — 99283 EMERGENCY DEPT VISIT LOW MDM: CPT

## 2024-08-14 PROCEDURE — 73562 X-RAY EXAM OF KNEE 3: CPT

## 2024-08-14 PROCEDURE — 96372 THER/PROPH/DIAG INJ SC/IM: CPT

## 2024-08-14 PROCEDURE — 25010000002 KETOROLAC TROMETHAMINE PER 15 MG

## 2024-08-14 RX ORDER — KETOROLAC TROMETHAMINE 30 MG/ML
30 INJECTION, SOLUTION INTRAMUSCULAR; INTRAVENOUS ONCE
Status: COMPLETED | OUTPATIENT
Start: 2024-08-14 | End: 2024-08-14

## 2024-08-14 RX ADMIN — KETOROLAC TROMETHAMINE 30 MG: 30 INJECTION, SOLUTION INTRAMUSCULAR; INTRAVENOUS at 22:32

## 2024-08-15 NOTE — DISCHARGE INSTRUCTIONS
Please follow with PCP as soon as possible to reassess symptoms.  Will likely need referral to orthopedics.  Please ice and elevate.  Okay to continue alternating Tylenol and ibuprofen as needed for pain.  Return to the ER for any new or worsening symptoms.

## 2024-08-15 NOTE — ED PROVIDER NOTES
Subjective   History of Present Illness  Patient is a 64-year-old male who presents emergency department with complaints of right knee pain.  Reports that it started 2 days ago.  Patient denies any known injury or trauma.  Patient reports that the pain is in the anterior aspect of his right knee that radiates to the lateral aspect and posterior aspect of his knee.  Reports that the pain does radiate up into his lateral aspect of right thigh.  No calf tenderness.  Pulses are palpable on exam.  No swelling, erythema, warmth noted on exam.        Review of Systems   Musculoskeletal:  Positive for arthralgias.   All other systems reviewed and are negative.      Past Medical History:   Diagnosis Date    Abnormal ECG     BPH (benign prostatic hyperplasia)     COVID-19     GERD (gastroesophageal reflux disease)     Vertigo        No Known Allergies    No past surgical history on file.    Family History   Problem Relation Age of Onset    Cancer Father         kidney, bone cancer    Coronary artery disease Neg Hx        Social History     Socioeconomic History    Marital status:    Tobacco Use    Smoking status: Never    Smokeless tobacco: Never   Vaping Use    Vaping status: Never Used   Substance and Sexual Activity    Alcohol use: No    Drug use: No    Sexual activity: Defer           Objective   Physical Exam  Vitals and nursing note reviewed.   Constitutional:       General: He is not in acute distress.     Appearance: Normal appearance. He is normal weight. He is not ill-appearing or toxic-appearing.   HENT:      Head: Normocephalic.   Cardiovascular:      Rate and Rhythm: Normal rate and regular rhythm.      Pulses: Normal pulses.           Dorsalis pedis pulses are 2+ on the right side.      Heart sounds: Normal heart sounds.   Pulmonary:      Effort: Pulmonary effort is normal.      Breath sounds: Normal breath sounds.   Abdominal:      General: Abdomen is flat. Bowel sounds are normal.      Palpations:  Abdomen is soft.   Musculoskeletal:         General: Tenderness present. No swelling, deformity or signs of injury. Normal range of motion.      Cervical back: Normal range of motion and neck supple.      Right lower leg: No edema.      Left lower leg: No edema.      Comments: Mild tenderness to right knee. No erythema, warmth, swelling noted on exam   Skin:     General: Skin is warm and dry.      Findings: No bruising or erythema.   Neurological:      General: No focal deficit present.      Mental Status: He is alert and oriented to person, place, and time. Mental status is at baseline.   Psychiatric:         Mood and Affect: Mood normal.         Behavior: Behavior normal.         Thought Content: Thought content normal.         Judgment: Judgment normal.         Procedures           ED Course                                             Medical Decision Making  Patient is a 64-year-old male who presents emergency department with complaints of right knee pain.  Reports that it started 2 days ago.  Patient denies any known injury or trauma.  Patient reports that the pain is in the anterior aspect of his right knee that radiates to the lateral aspect and posterior aspect of his knee.  Reports that the pain does radiate up into his lateral aspect of right thigh.  No calf tenderness.  Pulses are palpable on exam.  No swelling, erythema, warmth noted on exam.    Differential diagnoses include fracture, arthritis, ligament tear, sprain other. X-ray ordered in the ER. Reviewed by radiologist.  X-ray revealed no acute findings, mild degenerative changes.  On exam, patient had no swelling, erythema, warmth noted on exam.  Low suspicion for septic joint.  Patient was advised to follow-up with PCP to reassess symptoms.  May likely need referral to orthopedics as could likely be ligament tear or sprain.  Advised to return to the ER for any new or worsening symptoms.  Patient verbalized understanding of discharge instructions  and agreed with them.  Patient discharged in stable addition.    Problems Addressed:  Acute pain of right knee: complicated acute illness or injury    Amount and/or Complexity of Data Reviewed  Radiology: ordered.    Risk  Prescription drug management.        Final diagnoses:   Acute pain of right knee       ED Disposition  ED Disposition       ED Disposition   Discharge    Condition   Stable    Comment   --               Suman Lovett MD  [address] [facility] [address]  [phone]    Schedule an appointment as soon as possible for a visit in 1 day      [facility] EMERGENCY DEPARTMENT  [address] [place] [id number] [phone]  Go to   If symptoms worsen         Medication List      No changes were made to your prescriptions during this visit.            Emily De Guzman, APRN  08/14/24 [id number]

## 2025-04-23 NOTE — PROGRESS NOTES
"Chief Complaint  BPH    Subjective          Zion Ortiz presents to [facility]   Patient has a history of BPH.  I followed him for about 4 years.  He says his symptoms are getting worse. See symptom score below under data  for specific symptomatology, severity as well as bother.  Currently on tamsulosin.  Formally on finasteride but we stopped this.  He has had no prior surgical procedures.  He denies any hematuria or recent urinary tract    PSA has increased since last visit.                Current Outpatient Medications:     pantoprazole (PROTONIX) 40 MG EC tablet, Take 1 tablet by mouth Daily., Disp: 30 tablet, Rfl: 11    tamsulosin (FLOMAX) 0.4 MG capsule 24 hr capsule, Take 2 capsules by mouth Daily., Disp: 180 capsule, Rfl: 3  Past Medical History:   Diagnosis Date    Abnormal ECG     BPH (benign prostatic hyperplasia)     COVID-19     GERD (gastroesophageal reflux disease)     Stomach ulcer     Vertigo      Past Surgical History:   Procedure Laterality Date    COLONOSCOPY  2009    ENDOSCOPY  2009           Review of Systems      Objective   PHYSICAL EXAM  Vital Signs:   Temp 97 °F (36.1 °C)   Ht 172.7 cm (68\")   Wt 81.7 kg (180 lb 3.2 oz)   BMI 27.40 kg/m²     Physical Exam      DATA  Result Review :              Results for orders placed or performed in visit on 05/29/25   POC Urinalysis Dipstick, Multipro    Collection Time: 05/29/25 11:13 AM    Specimen: Urine   Result Value Ref Range    Color Yellow Yellow, Straw, Dark Yellow, Juliane    Clarity, UA Clear Clear    Glucose, UA Negative Negative mg/dL    Bilirubin Negative Negative    Ketones, UA Negative Negative    Specific Gravity  1.010 1.005 - 1.030    Blood, UA Negative Negative    pH, Urine 7.0 5.0 - 8.0    Protein, POC Negative Negative mg/dL    Urobilinogen, UA 0.2 E.U./dL Normal, 0.2 E.U./dL    Nitrite, UA Negative Negative    Leukocytes Negative Negative             IPSS Questionnaire (AUA-7):  Incomplete " emptying  Over the past month, how often have you had a sensation of not emptying your bladder completely after you finished urinating?: About half the time (05/29/25 1102)  Frequency  Over the past month, how often have you had to urinate again less than two hours after you finishied urinating ?: Almost always (05/29/25 1102)  Intermittency  Over the past month, how often have you found you stopped and started again several time when you urinated ?: About half the time (05/29/25 1102)  Urgency  Over the last month, how often have you found it difficult  have you found it difficult to postpone urination ?: More than half the time (05/29/25 1102)  Weak Stream  Over the past month, how often have you had a weak urinary stream ?: About half the time (05/29/25 1102)  Straining  Over the past month, how often have you had to push or strain to begin urination ?: About half the time (05/29/25 1102)  Nocturia  Over the past month, how many times did you most typically get up to urinate from the time you went to bed until the time you got up in the morning ?: 2 times (05/29/25 1102)  Quality of life due to urinary symptoms  If you were to spend the rest of your life with your urinary condition the way it is now, how would feel about that?: Unhappy (05/29/25 1102)    Scores  Total IPSS Score: (!) 23 (05/29/25 1102)  Total Score = Symptomatic Level: Severely symptomatic: 20-35 (05/29/25 1102)      Estimation of residual urine via abdominal ultrasound  Residual Urine: 119 ml  Indication: BPH  Position: Supine  Examination: Incremental scanning of the suprapubic area using 3 MHz transducer using copious amounts of acoustic gel.   Findings: An anechoic area was demonstrated which represented the bladder, with measurement of residual urine as noted. I inspected this myself. In that the residual urine was stable or insignificant, no treatment will be necessary at this time.                ASSESSMENT AND PLAN          Problem List  Items Addressed This Visit    None  Visit Diagnoses         BPH with obstruction/lower urinary tract symptoms    -  Primary    Relevant Orders    POC Urinalysis Dipstick, Multipro (Completed)      Elevated prostate specific antigen (PSA)        Relevant Orders    PSA DIAGNOSTIC          Symptoms continue to worsen.  We talked about different options in the past.  I went back over UroLift, TURP and aquablation.  I think aquablation is his best option.  He continues to be sexually active so this would decrease the risk of retrograde ejaculation although I told him it is still about 20%.  Risk erectile dysfunction is probably 1%.  The risk of incontinence is less than 1%.  We talked about the typical postoperative follow-up.  He says that would work better for him and late 2025 or early 2026.  Needs a cystoscopy, transrectal ultrasound and uroflow study done to meet the Medicare criteria.  We will set those up in the fall.    PSA increase I believe is due to the finasteride.  We will probably repeat that at his next visit.  I gave him a copy of the PSA order so that when he gets his labs drawn late summer/fall at Dr. Lovett's, PSA to be obtained which we will reassess.      FOLLOW UP     No follow-ups on file.    I've had the privilege to manage this patient almost 4 years for BPH with worsening symptoms . This will require ongoing management to include follow-up, possible medication adjustments and other therapies.      (Please note that portions of this note were completed with a voice recognition program.)  Bernardino Fenton MD  05/30/25  06:31 CDT

## 2025-05-01 ENCOUNTER — TRANSCRIBE ORDERS (OUTPATIENT)
Dept: ADMINISTRATIVE | Facility: HOSPITAL | Age: 65
End: 2025-05-01
Payer: MEDICARE

## 2025-05-01 DIAGNOSIS — R07.89 OTHER CHEST PAIN: Primary | ICD-10-CM

## 2025-05-07 ENCOUNTER — OFFICE VISIT (OUTPATIENT)
Dept: GASTROENTEROLOGY | Facility: CLINIC | Age: 65
End: 2025-05-07
Payer: MEDICARE

## 2025-05-07 VITALS
OXYGEN SATURATION: 97 % | WEIGHT: 179.4 LBS | HEIGHT: 68 IN | DIASTOLIC BLOOD PRESSURE: 86 MMHG | TEMPERATURE: 97.8 F | SYSTOLIC BLOOD PRESSURE: 130 MMHG | BODY MASS INDEX: 27.19 KG/M2 | HEART RATE: 96 BPM

## 2025-05-07 DIAGNOSIS — R10.13 DYSPEPSIA: Primary | ICD-10-CM

## 2025-05-07 DIAGNOSIS — Z12.11 ENCOUNTER FOR SCREENING FOR MALIGNANT NEOPLASM OF COLON: ICD-10-CM

## 2025-05-07 RX ORDER — PANTOPRAZOLE SODIUM 40 MG/1
40 TABLET, DELAYED RELEASE ORAL DAILY
Qty: 30 TABLET | Refills: 11 | Status: SHIPPED | OUTPATIENT
Start: 2025-05-07

## 2025-05-07 RX ORDER — SODIUM, POTASSIUM,MAG SULFATES 17.5-3.13G
SOLUTION, RECONSTITUTED, ORAL ORAL
Qty: 177 ML | Refills: 0 | Status: SHIPPED | OUTPATIENT
Start: 2025-05-07

## 2025-05-07 NOTE — PROGRESS NOTES
Zion Ortiz  1960 5/7/2025  Chief Complaint   Patient presents with    GI Problem     Colonoscopy/endoscopy screening      Subjective   HPI  Zion Ortiz is a 64 y.o. male who presents as a referral for preventative maintenance. He has no complaints of nausea or vomiting. No change in bowels. No wt loss. No BRBPR. No melena. There is NO family hx for colon cancer. No abdominal pain.  Heart burn indigestion ongoing persistent worsening for a few months. Moderate to severe at times. No certain triggers. No alleviating factors. No brbpr. No melena. He has increased his Omeprazole to BID over the past few weeks. He has been on this for years. No dysphagia. Endo was last in 2009. No NSAIDS. He says he does have a remote hx of ulcers.    Past Medical History:   Diagnosis Date    Abnormal ECG     BPH (benign prostatic hyperplasia)     COVID-19     GERD (gastroesophageal reflux disease)     Stomach ulcer     Vertigo      Past Surgical History:   Procedure Laterality Date    COLONOSCOPY  2009    ENDOSCOPY  2009     Outpatient Medications Marked as Taking for the 5/7/25 encounter (Office Visit) with Tracey Slaughter APRN   Medication Sig Dispense Refill    tamsulosin (FLOMAX) 0.4 MG capsule 24 hr capsule Take 2 capsules by mouth Daily. 180 capsule 3    [DISCONTINUED] Omeprazole 20 MG tablet delayed-release Take 20 mg by mouth Daily.       No Known Allergies  Social History     Socioeconomic History    Marital status:    Tobacco Use    Smoking status: Never    Smokeless tobacco: Never   Vaping Use    Vaping status: Never Used   Substance and Sexual Activity    Alcohol use: No    Drug use: No    Sexual activity: Defer     Family History   Problem Relation Age of Onset    Cancer Father         kidney, bone cancer    Colon cancer Maternal Aunt     Coronary artery disease Neg Hx      Health Maintenance   Topic Date Due    TDAP/TD VACCINES (1 - Tdap) Never done    COLORECTAL CANCER SCREENING  Never done     "Pneumococcal Vaccine 50+ (1 of 1 - PCV) Never done    ZOSTER VACCINE (1 of 2) Never done    HEPATITIS C SCREENING  Never done    AAA SCREEN ONCE  Never done    COVID-19 Vaccine (4 - 2024-25 season) 09/01/2024    INFLUENZA VACCINE  07/01/2025    ANNUAL WELLNESS VISIT  08/19/2025       REVIEW OF SYSTEMS  General: well appearing, no fever chills or sweats, no unexplained wt loss  HEENT: no acute visual or hearing disturbances  Cardiovascular: No chest pain or palpitations  Pulmonary: No shortness of breath, coughing, wheezing or hemoptysis  : No burning, urgency, hematuria, or dysuria  GI: reflux/indigestion  Musculoskeletal: No joint pain or stiffness  Peripheral: no edema  Skin: No lesions or rashes  Neuro: No dizziness, headaches, stroke, syncope  Endocrine: No hot or cold intolerances  Hematological: No blood dyscrasias    Objective   Vitals:    05/07/25 1014   BP: 130/86   Pulse: 96   Temp: 97.8 °F (36.6 °C)   SpO2: 97%   Weight: 81.4 kg (179 lb 6.4 oz)   Height: 172.7 cm (68\")     Body mass index is 27.28 kg/m².  BMI is >= 25 and <30. (Overweight) The following options were offered after discussion;: weight loss educational material (shared in after visit summary)      PHYSICAL EXAM  General: age appropriate well nourished well appearing, no acute distress  Head: normocephalic and atraumatic  Global assessment-supple  Neck-No JVD noted, no lymphadenopathy  Pulmonary-clear to auscultation bilaterally, normal respiratory effort  Cardiovascular-normal rate and rhythm, normal heart sounds, S1 and S2 noted  Abdomen-soft, non tender, non distended, normal bowel sounds all 4 quadrants, no hepatosplenomegaly noted  Extremities-No clubbing cyanosis or edema  Neuro-Non focal, converses appropriately, awake, alert, oriented    Assessment & Plan     Diagnoses and all orders for this visit:    1. Dyspepsia (Primary)  -     Case Request; Standing  -     Case Request  -     pantoprazole (PROTONIX) 40 MG EC tablet; Take 1 " tablet by mouth Daily.  Dispense: 30 tablet; Refill: 11    2. Encounter for screening for malignant neoplasm of colon  -     Case Request; Standing  -     Case Request    Other orders  -     Cancel: Implement Anesthesia Orders Day of Procedure; Standing  -     Follow Anesthesia Guidelines / Protocol; Future  -     Cancel: Verify Bowel Prep Was Successful; Standing  -     Implement Anesthesia Orders Day of Procedure; Standing  -     Follow Anesthesia Guidelines / Protocol; Future  -     Verify Bowel Prep Was Successful; Standing  -     sodium-potassium-magnesium sulfates (Suprep Bowel Prep Kit) 17.5-3.13-1.6 GM/177ML solution oral solution; Take as directed by office instructions provided  Dispense: 177 mL; Refill: 0    I discussed non pharmaceutical treatment of gerd.  This includes gradually losing weight to achieve ideal body wt., elevation of the head of bed by 4-6 inches, nothing to eat or drink 3 hours prior to lying down, avoiding tight clothing, stress reduction, tobacco cessation, reduction of alcohol intake, and dietary restrictions (avoiding caffeine, coffee, fatty foods, mints, chocolate, spicy foods and tomato based sauces as much as possible).  Will change his PPI to Protonix    ESOPHAGOGASTRODUODENOSCOPY WITH ANESTHESIA (N/A), COLONOSCOPY WITH ANESTHESIA (N/A)  Body mass index is 27.28 kg/m².    Patient instructions on prep prior to procedure provided to the patient.    All risks, benefits, alternatives, and indications of colonoscopy procedure have been discussed with the patient. Risks to include perforation of the colon requiring possible surgery or colostomy, risk of bleeding from biopsies or removal of colon tissue, possibility of missing a colon polyp or cancer, or adverse drug reaction.  Benefits to include the diagnosis and management of disease of the colon and rectum. Alternatives to include barium enema, radiographic evaluation, lab testing or no intervention. Pt verbalizes understanding and  agrees.     Tracey Slaughter, APRN  2025  11:07 CDT      IF YOU SMOKE OR USE TOBACCO PLEASE READ THE FOLLOWIN minutes reading provided    Why is smoking bad for me?  Smoking increases the risk of heart disease, lung disease, vascular disease, stroke, and cancer.     If you smoke, STOP!    If you would like more information on quitting smoking, please visit the [employer] website: [email or web address]   This link will provide additional resources including the QUIT line and the Beat the Pack support groups.     For more information:    Quit Now [place]  -QUIT-NOW  [email or web address]/en-[email or web address]

## 2025-05-20 ENCOUNTER — HOSPITAL ENCOUNTER (OUTPATIENT)
Dept: CARDIOLOGY | Facility: HOSPITAL | Age: 65
Discharge: HOME | End: 2025-05-20
Payer: MEDICARE

## 2025-05-20 VITALS
HEART RATE: 83 BPM | HEIGHT: 68 IN | BODY MASS INDEX: 26.52 KG/M2 | SYSTOLIC BLOOD PRESSURE: 145 MMHG | DIASTOLIC BLOOD PRESSURE: 89 MMHG | WEIGHT: 175 LBS

## 2025-05-20 DIAGNOSIS — R07.89 OTHER CHEST PAIN: ICD-10-CM

## 2025-05-20 PROCEDURE — 93017 CV STRESS TEST TRACING ONLY: CPT

## 2025-05-21 LAB
BH CV STRESS BP STAGE 1: NORMAL
BH CV STRESS BP STAGE 2: NORMAL
BH CV STRESS BP STAGE 3: NORMAL
BH CV STRESS DURATION MIN STAGE 1: 3
BH CV STRESS DURATION MIN STAGE 2: 3
BH CV STRESS DURATION MIN STAGE 3: 1
BH CV STRESS DURATION SEC STAGE 1: 0
BH CV STRESS DURATION SEC STAGE 2: 0
BH CV STRESS DURATION SEC STAGE 3: 0
BH CV STRESS GRADE STAGE 1: 10
BH CV STRESS GRADE STAGE 2: 12
BH CV STRESS GRADE STAGE 3: 14
BH CV STRESS HR STAGE 1: 114
BH CV STRESS HR STAGE 2: 140
BH CV STRESS HR STAGE 3: 144
BH CV STRESS METS STAGE 1: 5
BH CV STRESS METS STAGE 2: 7.5
BH CV STRESS METS STAGE 3: 10
BH CV STRESS PROTOCOL 1: NORMAL
BH CV STRESS RECOVERY BP: NORMAL MMHG
BH CV STRESS RECOVERY HR: 97 BPM
BH CV STRESS SPEED STAGE 1: 1.7
BH CV STRESS SPEED STAGE 2: 2.5
BH CV STRESS SPEED STAGE 3: 3.4
BH CV STRESS STAGE 1: 1
BH CV STRESS STAGE 2: 2
BH CV STRESS STAGE 3: 3
MAXIMAL PREDICTED HEART RATE: 155 BPM
PERCENT MAX PREDICTED HR: 92.9 %
STRESS BASELINE BP: NORMAL MMHG
STRESS BASELINE HR: 83 BPM
STRESS PERCENT HR: 109 %
STRESS POST ESTIMATED WORKLOAD: 10 METS
STRESS POST EXERCISE DUR MIN: 7 MIN
STRESS POST EXERCISE DUR SEC: 0 SEC
STRESS POST PEAK BP: NORMAL MMHG
STRESS POST PEAK HR: 144 BPM
STRESS TARGET HR: 132 BPM

## 2025-05-29 ENCOUNTER — OFFICE VISIT (OUTPATIENT)
Dept: UROLOGY | Facility: CLINIC | Age: 65
End: 2025-05-29
Payer: MEDICARE

## 2025-05-29 VITALS — HEIGHT: 68 IN | WEIGHT: 180.2 LBS | TEMPERATURE: 97 F | BODY MASS INDEX: 27.31 KG/M2

## 2025-05-29 DIAGNOSIS — R97.20 ELEVATED PROSTATE SPECIFIC ANTIGEN (PSA): ICD-10-CM

## 2025-05-29 DIAGNOSIS — N40.1 BPH WITH OBSTRUCTION/LOWER URINARY TRACT SYMPTOMS: Primary | ICD-10-CM

## 2025-05-29 DIAGNOSIS — N13.8 BPH WITH OBSTRUCTION/LOWER URINARY TRACT SYMPTOMS: Primary | ICD-10-CM

## 2025-05-29 LAB
BILIRUB BLD-MCNC: NEGATIVE MG/DL
CLARITY, POC: CLEAR
COLOR UR: YELLOW
GLUCOSE UR STRIP-MCNC: NEGATIVE MG/DL
KETONES UR QL: NEGATIVE
LEUKOCYTE EST, POC: NEGATIVE
NITRITE UR-MCNC: NEGATIVE MG/ML
PH UR: 7 [PH] (ref 5–8)
PROT UR STRIP-MCNC: NEGATIVE MG/DL
RBC # UR STRIP: NEGATIVE /UL
SP GR UR: 1.01 (ref 1–1.03)
UROBILINOGEN UR QL: NORMAL

## 2025-05-31 ENCOUNTER — APPOINTMENT (OUTPATIENT)
Dept: CT IMAGING | Facility: HOSPITAL | Age: 65
End: 2025-05-31
Payer: MEDICARE

## 2025-05-31 ENCOUNTER — HOSPITAL ENCOUNTER (EMERGENCY)
Facility: HOSPITAL | Age: 65
Discharge: HOME | End: 2025-06-01
Payer: MEDICARE

## 2025-05-31 ENCOUNTER — APPOINTMENT (OUTPATIENT)
Dept: GENERAL RADIOLOGY | Facility: HOSPITAL | Age: 65
End: 2025-05-31
Payer: MEDICARE

## 2025-05-31 VITALS
TEMPERATURE: 97.9 F | RESPIRATION RATE: 15 BRPM | SYSTOLIC BLOOD PRESSURE: 144 MMHG | HEIGHT: 68 IN | BODY MASS INDEX: 26.52 KG/M2 | WEIGHT: 175 LBS | HEART RATE: 61 BPM | OXYGEN SATURATION: 96 % | DIASTOLIC BLOOD PRESSURE: 86 MMHG

## 2025-05-31 DIAGNOSIS — S29.9XXA INJURY OF CHEST WALL, INITIAL ENCOUNTER: Primary | ICD-10-CM

## 2025-05-31 PROCEDURE — 93010 ELECTROCARDIOGRAM REPORT: CPT | Performed by: STUDENT IN AN ORGANIZED HEALTH CARE EDUCATION/TRAINING PROGRAM

## 2025-05-31 PROCEDURE — 93005 ELECTROCARDIOGRAM TRACING: CPT | Performed by: EMERGENCY MEDICINE

## 2025-05-31 PROCEDURE — 99284 EMERGENCY DEPT VISIT MOD MDM: CPT

## 2025-05-31 PROCEDURE — 71250 CT THORAX DX C-: CPT

## 2025-05-31 PROCEDURE — 71045 X-RAY EXAM CHEST 1 VIEW: CPT

## 2025-05-31 RX ORDER — OXYCODONE AND ACETAMINOPHEN 5; 325 MG/1; MG/1
1 TABLET ORAL EVERY 8 HOURS PRN
Qty: 12 TABLET | Refills: 0 | Status: SHIPPED | OUTPATIENT
Start: 2025-05-31

## 2025-05-31 RX ORDER — ONDANSETRON 4 MG/1
4 TABLET, ORALLY DISINTEGRATING ORAL ONCE
Status: DISCONTINUED | OUTPATIENT
Start: 2025-05-31 | End: 2025-06-01 | Stop reason: HOSPADM

## 2025-05-31 RX ORDER — OXYCODONE AND ACETAMINOPHEN 7.5; 325 MG/1; MG/1
1 TABLET ORAL ONCE
Refills: 0 | Status: DISCONTINUED | OUTPATIENT
Start: 2025-05-31 | End: 2025-06-01 | Stop reason: HOSPADM

## 2025-06-01 NOTE — ED PROVIDER NOTES
Subjective   History of Present Illness  Patient is a 64-year-old male who presents emergency department today for complaint of sternum pain after sustaining a fall today.  Patient states that he was out working in his yard and he twisted his left foot wrong and he fell directly on his chest onto a leaf blower, states he did not have any other injury from this fall.  He states that he is not taking as deep breaths as normal, he states he is not short of breath.  He states that its mostly in the center of his chest and sometimes is in the right of his chest.  Denies any syncope, loss of consciousness, fever, chills, nausea, vomiting, diarrhea, and any other symptoms.  Past medical history of abnormal ECG, BPH, COVID-19, GERD, and stomach ulcer, and vertigo.        Review of Systems   Musculoskeletal:         Sternum pain   All other systems reviewed and are negative.      Past Medical History:   Diagnosis Date    Abnormal ECG     BPH (benign prostatic hyperplasia)     COVID-19     GERD (gastroesophageal reflux disease)     Stomach ulcer     Vertigo        No Known Allergies    Past Surgical History:   Procedure Laterality Date    COLONOSCOPY  2009    ENDOSCOPY  2009       Family History   Problem Relation Age of Onset    Cancer Father         kidney, bone cancer    Colon cancer Maternal Aunt     Coronary artery disease Neg Hx        Social History     Socioeconomic History    Marital status:    Tobacco Use    Smoking status: Never    Smokeless tobacco: Never   Vaping Use    Vaping status: Never Used   Substance and Sexual Activity    Alcohol use: No    Drug use: No    Sexual activity: Defer           Objective   Physical Exam  Vitals and nursing note reviewed.   Constitutional:       General: He is not in acute distress.     Appearance: Normal appearance. He is normal weight. He is not ill-appearing, toxic-appearing or diaphoretic.   HENT:      Head: Normocephalic and atraumatic.      Right Ear: Tympanic  membrane, ear canal and external ear normal. There is no impacted cerumen.      Left Ear: Tympanic membrane, ear canal and external ear normal. There is no impacted cerumen.      Nose: Nose normal. No congestion or rhinorrhea.      Mouth/Throat:      Mouth: Mucous membranes are moist.      Pharynx: Oropharynx is clear. No oropharyngeal exudate or posterior oropharyngeal erythema.   Eyes:      Extraocular Movements: Extraocular movements intact.      Conjunctiva/sclera: Conjunctivae normal.      Pupils: Pupils are equal, round, and reactive to light.   Cardiovascular:      Rate and Rhythm: Normal rate and regular rhythm.      Pulses: Normal pulses.      Heart sounds: No murmur heard.     No gallop.   Pulmonary:      Effort: Pulmonary effort is normal. No respiratory distress.      Breath sounds: Normal breath sounds. No stridor. No wheezing, rhonchi or rales.      Comments: Breath sounds are clear and equal bilaterally, no tachypnea, no accessory muscle usage, no respiratory distress, no stridor, no wheezing, no rhonchi, no rales, normal breath sounds bilaterally, no decreased breath sounds.  There is chest wall tenderness noted to palpation, there is some bruising and redness to the center of the chest.  Consistent with the mechanism of injury.  Chest:      Chest wall: Tenderness present.   Abdominal:      General: Abdomen is flat. Bowel sounds are normal. There is no distension.      Palpations: Abdomen is soft. There is no mass.      Tenderness: There is no abdominal tenderness. There is no right CVA tenderness, left CVA tenderness, guarding or rebound.      Hernia: No hernia is present.   Musculoskeletal:         General: No swelling, tenderness, deformity or signs of injury. Normal range of motion.      Cervical back: Normal range of motion and neck supple. No rigidity or tenderness.      Right lower leg: No edema.      Left lower leg: No edema.   Lymphadenopathy:      Cervical: No cervical adenopathy.   Skin:      General: Skin is warm and dry.      Capillary Refill: Capillary refill takes less than 2 seconds.      Coloration: Skin is not jaundiced or pale.      Findings: No bruising, erythema, lesion or rash.   Neurological:      General: No focal deficit present.      Mental Status: He is alert and oriented to person, place, and time. Mental status is at baseline.      Cranial Nerves: No cranial nerve deficit.      Sensory: No sensory deficit.      Motor: No weakness.      Coordination: Coordination normal.      Gait: Gait normal.      Deep Tendon Reflexes: Reflexes normal.   Psychiatric:         Mood and Affect: Mood normal.         Behavior: Behavior normal.         Thought Content: Thought content normal.         Judgment: Judgment normal.         Procedures           ED Course  ED Course as of 06/02/25 0048   Sat May 31, 2025   2020 Chest x-ray ordered. [BS]   2020 EKG was ordered in triage, normal sinus rhythm, no ischemia. [BS]   2118 Chest x-ray       IMPRESSION:     1. No active disease in the chest.   [BS]   2131 Discussed case with Dr. Hall, instructed me to proceed with his CT chest without contrast. [BS]   2334 CT chest without contrast shows no acute traumatic injury within the visualized chest.  No acute findings.  No acute cortical osseous fractures. [BS]   2342 Discussed case with Dr. Zaragoza.  We reviewed patient workup, we will treat this as a rib fracture.  We want the patient to use incentive spirometer as ordered.  Encouraged him to take deep breaths.  We did send him in some pain medication to the pharmacy.  I also instructed him that is important to follow-up with his primary care doctor.  Instructed to, to the ER with any new or worsening symptoms.  Patient will be discharged home in a stable condition. [BS]   2343 Ordered Percocet and Zofran prior to discharge. [BS]   2356 Patient refused Percocet, he states that he does not like to take pain pills.  He states that he will take a Tylenol at  "home.  The patient does not have any left-sided chest pain, was EKG was normal, he does have tenderness noted to palpation on chest wall, there is bruising and redness.  Low concern for cardiac ischemia.  I do think the patient needs to have close follow-up with primary care doctor.  Instructed him to come back to the ER with any new or worsening symptoms.  Patient will be discharged home in stable condition. [BS]      ED Course User Index  [BS] Geovanna Guajardo, APRN                                                       Medical Decision Making  Problems Addressed:  Injury of chest wall, initial encounter: complicated acute illness or injury    Amount and/or Complexity of Data Reviewed  Radiology: ordered.      Patient is a 64-year-old male who presents emergency department today for complaint of sternum pain after sustaining a fall today.  Patient states that he was out working in his yard and he twisted his left foot wrong and he fell directly on his chest onto a leaf blower, states he did not have any other injury from this fall.  He states that he is not taking as deep breaths as normal, he states he is not short of breath.  He states that its mostly in the center of his chest and sometimes is in the right of his chest.  Denies any syncope, loss of consciousness, fever, chills, nausea, vomiting, diarrhea, and any other symptoms.  Past medical history of abnormal ECG, BPH, COVID-19, GERD, and stomach ulcer, and vertigo.    Differential diagnosis include but are not limited to rib fracture, pneumothorax, hemothorax, pneumonia, MI, NSTEMI, and other etiologies.    CT Chest Without Contrast Diagnostic   Final Result   1. No acute fracture is seen. Specifically, no sternal fracture is seen.   2. A 4 mm right middle lobe pulmonary nodule. Fleischner Society   guidelines recommends follow-up CT in 12 months in high risk patients.   This was marked as \"lung finding\" in PACS for follow-up.   3. Minimal atheromatous " calcification of the thoracic aorta. Ectasia of   the ascending aorta measuring 3.9 cm. Cardiomegaly. No coronary artery   calcification seen.               This report was signed and finalized on 6/1/2025 1:13 PM by Dr. Abdoul Sanders MD.          XR Chest 1 View   Final Result       1. No active disease in the chest.       This report was signed and finalized on 5/31/2025 8:34 PM by José Serrano.             Patient is a 64-year-old male who presents emergency department today for complaint of sternum pain after sustaining a fall today.  Patient states that he was out working in his yard and he twisted his left foot wrong and he fell directly on his chest onto a leaf blower, states he did not have any other injury from this fall.  He states that he is not taking as deep breaths as normal, he states he is not short of breath.  He states that its mostly in the center of his chest and sometimes is in the right of his chest.  On exam he is in no acute distress, normal bolus, normal weight, not ill-appearing, nontoxic-appearing, nondiaphoretic. Breath sounds are clear and equal bilaterally, no tachypnea, no accessory muscle usage, no respiratory distress, no stridor, no wheezing, no rhonchi, no rales, normal breath sounds bilaterally, no decreased breath sounds. There is chest wall tenderness noted to palpation, there is some bruising and redness to the center of the chest. Consistent with the mechanism of injury.  Otherwise exam is unremarkable.  Chest x-ray is ordered, EKG was ordered in triage, normal sinus rhythm, no ischemia.  Chest x-ray shows no active disease in the chest.  Discussed case with Dr. Hall, instructed me to proceed with CT chest without contrast.  CT without contrast shows no acute traumatic injury within the visualized chest.  No acute findings.  No acute cortical osseous fractures. Discussed case with Dr. Zaragoza.  We reviewed patient workup, we will treat this as a rib fracture.  We  want the patient to use incentive spirometer as ordered.  Encouraged him to take deep breaths.  We did send him in some pain medication to the pharmacy.  I also instructed him that is important to follow-up with his primary care doctor.  Instructed to, to the ER with any new or worsening symptoms.  Patient will be discharged home in a stable condition. Ordered Percocet and Zofran prior to discharge. Patient refused Percocet, he states that he does not like to take pain pills.  He states that he will take a Tylenol at home.  The patient does not have any left-sided chest pain, was EKG was normal, he does have tenderness noted to palpation on chest wall, there is bruising and redness.  Low concern for cardiac ischemia.  I do think the patient needs to have close follow-up with primary care doctor.  Instructed him to come back to the ER with any new or worsening symptoms.  Patient will be discharged home in stable condition.    Final diagnoses:   Injury of chest wall, initial encounter       ED Disposition  ED Disposition       ED Disposition   Discharge    Condition   Stable    Comment   --               Suman Lovett MD  [address] [id number]  [phone]    Schedule an appointment as soon as possible for a visit in 2 days  follow up    [facility] EMERGENCY DEPARTMENT  [address] [place] [id number] [phone]    If symptoms worsen, As needed         Medication List        New Prescriptions      oxyCODONE-acetaminophen 5-325 MG per tablet  Commonly known as: PERCOCET  Take 1 tablet by mouth Every 8 (Eight) Hours As Needed for Severe Pain.               Where to Get Your Medications        These medications were sent to [facility] - [place], [place] - [address] - [phone] [facility] [phone]   [address] [facility] [address]      Phone: [phone]   oxyCODONE-acetaminophen 5-325 MG per tablet            Geovanna Guajardo APRN  06/02/25 0048

## 2025-06-01 NOTE — DISCHARGE INSTRUCTIONS
It was very nice to meet you, Zion. Thank you for allowing us to take care of you today at [facility].    I am going to treat you as if you have a rib fracture, I did send you in some pain medication to the pharmacy.  I want you to follow-up with your primary care doctor.  Come back to the ER with any new or worsening symptoms.  I want you to use incentive spirometer as well.    Please understand that an ER evaluation is just the start of your evaluation. We will do what we can, but we are often unable to fully figure out what is causing your symptoms from one evaluation. Thus, our primary goal is to determine whether you need to be evaluated in the hospital or if it is safe for you to go home and see other doctors such as a primary care physician or a specialist on an outpatient basis.     Like we discussed, it is VERY IMPORTANT that you follow up with your primary care doctor (call them to set up an appointment) within the next few days or as soon as possible so that you can be re-evaluated for improvement in your symptoms or for any other questions.     Please return to the emergency room within 12-48 hours if you experience fever, chills, chest pain or shortness of breath, pain with inspiration/expiration, pain that travels to your arms, neck or back, nausea, vomiting, severe headache, tearing pain in your chest, dizziness, feel as though you are about to pass out, have any worsening symptoms, or any other concerns.

## 2025-06-02 LAB
QT INTERVAL: 408 MS
QTC INTERVAL: 459 MS

## 2025-07-28 ENCOUNTER — HOSPITAL ENCOUNTER (EMERGENCY)
Facility: HOSPITAL | Age: 65
Discharge: HOME | End: 2025-07-29
Attending: EMERGENCY MEDICINE | Admitting: EMERGENCY MEDICINE
Payer: MEDICARE

## 2025-07-28 DIAGNOSIS — R33.8 ACUTE URINARY RETENTION: Primary | ICD-10-CM

## 2025-07-28 LAB
ANION GAP SERPL CALCULATED.3IONS-SCNC: 17 MMOL/L (ref 5–15)
BACTERIA UR QL AUTO: ABNORMAL /HPF
BASOPHILS # BLD AUTO: 0.01 10*3/MM3 (ref 0–0.2)
BASOPHILS NFR BLD AUTO: 0.1 % (ref 0–1.5)
BILIRUB UR QL STRIP: NEGATIVE
BUN SERPL-MCNC: 13.1 MG/DL (ref 8–23)
BUN/CREAT SERPL: 14.6 (ref 7–25)
CALCIUM SPEC-SCNC: 9.3 MG/DL (ref 8.6–10.5)
CHLORIDE SERPL-SCNC: 106 MMOL/L (ref 98–107)
CLARITY UR: ABNORMAL
CO2 SERPL-SCNC: 19 MMOL/L (ref 22–29)
COLOR UR: ABNORMAL
CREAT SERPL-MCNC: 0.9 MG/DL (ref 0.76–1.27)
DEPRECATED RDW RBC AUTO: 38.5 FL (ref 37–54)
EGFRCR SERPLBLD CKD-EPI 2021: 94.2 ML/MIN/1.73
EOSINOPHIL # BLD AUTO: 0.01 10*3/MM3 (ref 0–0.4)
EOSINOPHIL NFR BLD AUTO: 0.1 % (ref 0.3–6.2)
ERYTHROCYTE [DISTWIDTH] IN BLOOD BY AUTOMATED COUNT: 12.6 % (ref 12.3–15.4)
GLUCOSE SERPL-MCNC: 133 MG/DL (ref 65–99)
GLUCOSE UR STRIP-MCNC: NEGATIVE MG/DL
HCT VFR BLD AUTO: 38.3 % (ref 37.5–51)
HGB BLD-MCNC: 13 G/DL (ref 13–17.7)
HGB UR QL STRIP.AUTO: ABNORMAL
HYALINE CASTS UR QL AUTO: ABNORMAL /LPF
IMM GRANULOCYTES # BLD AUTO: 0.03 10*3/MM3 (ref 0–0.05)
IMM GRANULOCYTES NFR BLD AUTO: 0.4 % (ref 0–0.5)
INR PPP: 1.04 (ref 0.91–1.09)
KETONES UR QL STRIP: ABNORMAL
LEUKOCYTE ESTERASE UR QL STRIP.AUTO: ABNORMAL
LYMPHOCYTES # BLD AUTO: 0.89 10*3/MM3 (ref 0.7–3.1)
LYMPHOCYTES NFR BLD AUTO: 12 % (ref 19.6–45.3)
MCH RBC QN AUTO: 28.6 PG (ref 26.6–33)
MCHC RBC AUTO-ENTMCNC: 33.9 G/DL (ref 31.5–35.7)
MCV RBC AUTO: 84.2 FL (ref 79–97)
MONOCYTES # BLD AUTO: 0.3 10*3/MM3 (ref 0.1–0.9)
MONOCYTES NFR BLD AUTO: 4.1 % (ref 5–12)
NEUTROPHILS NFR BLD AUTO: 6.15 10*3/MM3 (ref 1.7–7)
NEUTROPHILS NFR BLD AUTO: 83.3 % (ref 42.7–76)
NITRITE UR QL STRIP: NEGATIVE
NRBC BLD AUTO-RTO: 0 /100 WBC (ref 0–0.2)
PH UR STRIP.AUTO: 6 [PH] (ref 5–8)
PLATELET # BLD AUTO: 188 10*3/MM3 (ref 140–450)
PMV BLD AUTO: 9.2 FL (ref 6–12)
POTASSIUM SERPL-SCNC: 3.5 MMOL/L (ref 3.5–5.2)
PROT UR QL STRIP: ABNORMAL
PROTHROMBIN TIME: 14.1 SECONDS (ref 11.8–14.8)
RBC # BLD AUTO: 4.55 10*6/MM3 (ref 4.14–5.8)
RBC # UR STRIP: ABNORMAL /HPF
REF LAB TEST METHOD: ABNORMAL
SODIUM SERPL-SCNC: 142 MMOL/L (ref 136–145)
SP GR UR STRIP: 1.01 (ref 1–1.03)
SQUAMOUS #/AREA URNS HPF: ABNORMAL /HPF
UROBILINOGEN UR QL STRIP: ABNORMAL
WBC # UR STRIP: ABNORMAL /HPF
WBC NRBC COR # BLD AUTO: 7.39 10*3/MM3 (ref 3.4–10.8)
YEAST URNS QL MICRO: ABNORMAL /HPF

## 2025-07-28 PROCEDURE — 81001 URINALYSIS AUTO W/SCOPE: CPT | Performed by: EMERGENCY MEDICINE

## 2025-07-28 PROCEDURE — 25010000002 ONDANSETRON PER 1 MG: Performed by: EMERGENCY MEDICINE

## 2025-07-28 PROCEDURE — 85025 COMPLETE CBC W/AUTO DIFF WBC: CPT | Performed by: EMERGENCY MEDICINE

## 2025-07-28 PROCEDURE — 51703 INSERT BLADDER CATH COMPLEX: CPT | Performed by: UROLOGY

## 2025-07-28 PROCEDURE — 25010000002 MORPHINE PER 10 MG: Performed by: EMERGENCY MEDICINE

## 2025-07-28 PROCEDURE — 85610 PROTHROMBIN TIME: CPT | Performed by: EMERGENCY MEDICINE

## 2025-07-28 PROCEDURE — 96374 THER/PROPH/DIAG INJ IV PUSH: CPT

## 2025-07-28 PROCEDURE — 51703 INSERT BLADDER CATH COMPLEX: CPT

## 2025-07-28 PROCEDURE — 99284 EMERGENCY DEPT VISIT MOD MDM: CPT | Performed by: UROLOGY

## 2025-07-28 PROCEDURE — 99283 EMERGENCY DEPT VISIT LOW MDM: CPT | Performed by: EMERGENCY MEDICINE

## 2025-07-28 PROCEDURE — 80048 BASIC METABOLIC PNL TOTAL CA: CPT | Performed by: EMERGENCY MEDICINE

## 2025-07-28 PROCEDURE — 96375 TX/PRO/DX INJ NEW DRUG ADDON: CPT

## 2025-07-28 RX ORDER — ONDANSETRON 2 MG/ML
4 INJECTION INTRAMUSCULAR; INTRAVENOUS ONCE
Status: COMPLETED | OUTPATIENT
Start: 2025-07-28 | End: 2025-07-28

## 2025-07-28 RX ORDER — LIDOCAINE HYDROCHLORIDE 20 MG/ML
JELLY TOPICAL ONCE
Status: COMPLETED | OUTPATIENT
Start: 2025-07-28 | End: 2025-07-28

## 2025-07-28 RX ORDER — CEFDINIR 300 MG/1
300 CAPSULE ORAL 2 TIMES DAILY
Qty: 14 CAPSULE | Refills: 0 | Status: SHIPPED | OUTPATIENT
Start: 2025-07-28

## 2025-07-28 RX ORDER — SODIUM CHLORIDE 0.9 % (FLUSH) 0.9 %
10 SYRINGE (ML) INJECTION AS NEEDED
Status: DISCONTINUED | OUTPATIENT
Start: 2025-07-28 | End: 2025-07-29 | Stop reason: HOSPADM

## 2025-07-28 RX ORDER — LIDOCAINE HYDROCHLORIDE 20 MG/ML
JELLY TOPICAL AS NEEDED
Status: DISCONTINUED | OUTPATIENT
Start: 2025-07-28 | End: 2025-07-29 | Stop reason: HOSPADM

## 2025-07-28 RX ORDER — HYDROMORPHONE HYDROCHLORIDE 1 MG/ML
0.5 INJECTION, SOLUTION INTRAMUSCULAR; INTRAVENOUS; SUBCUTANEOUS ONCE
Refills: 0 | Status: DISCONTINUED | OUTPATIENT
Start: 2025-07-28 | End: 2025-07-28

## 2025-07-28 RX ADMIN — ONDANSETRON 4 MG: 2 INJECTION INTRAMUSCULAR; INTRAVENOUS at 21:13

## 2025-07-28 RX ADMIN — LIDOCAINE HYDROCHLORIDE: 20 JELLY TOPICAL at 20:49

## 2025-07-28 RX ADMIN — MORPHINE SULFATE 4 MG: 4 INJECTION, SOLUTION INTRAMUSCULAR; INTRAVENOUS at 21:13

## 2025-07-28 RX ADMIN — LIDOCAINE HYDROCHLORIDE: 20 JELLY TOPICAL at 20:34

## 2025-07-29 ENCOUNTER — TELEPHONE (OUTPATIENT)
Dept: GASTROENTEROLOGY | Facility: CLINIC | Age: 65
End: 2025-07-29
Payer: MEDICARE

## 2025-07-29 VITALS
DIASTOLIC BLOOD PRESSURE: 80 MMHG | HEART RATE: 73 BPM | SYSTOLIC BLOOD PRESSURE: 128 MMHG | WEIGHT: 175.04 LBS | BODY MASS INDEX: 26.53 KG/M2 | TEMPERATURE: 97.8 F | RESPIRATION RATE: 18 BRPM | OXYGEN SATURATION: 95 % | HEIGHT: 68 IN

## 2025-07-29 NOTE — CONSULTS
"Urology H&P    Mr. Ortiz is 65 y.o. male    CHIEF COMPLAINT: Urinary retention    HPI  This 65-year-old male with known history of benign prostate hyperplasia and obstructive voiding symptoms presented to the emergency room tonight with inability to urinate.  He said this situation happened before and we have provided him with some catheters for self-catheterization when it happens.  He did try to catheterize himself but was unable to get any urine while after the catheter went in.  They did note some blood as the catheter got inside the prostate.    He presented to the emergency room.  There been multiple attempts of placing catheters were unsuccessful here.  Patient is in severe pain from urinary retention.  Says he is having a lot of \"bladder spasms \".    The following portions of the patient's history were reviewed and updated as appropriate: allergies, current medications, past family history, past medical history, past social history, past surgical history and problem list.    Review of Systems  He denies fever or chills  No hematuria until attempted catheterization  Denies dysuria    (Not in a hospital admission)        Current Facility-Administered Medications:     Lidocaine HCl gel (XYLOCAINE) urethral/mucosal syringe, , Topical, PRN, Tanner Land MD, Given at 07/28/25 2049    [COMPLETED] Insert Peripheral IV, , , Once **AND** sodium chloride 0.9 % flush 10 mL, 10 mL, Intravenous, PRN, Tanner Land MD    Current Outpatient Medications:     oxyCODONE-acetaminophen (PERCOCET) 5-325 MG per tablet, Take 1 tablet by mouth Every 8 (Eight) Hours As Needed for Severe Pain., Disp: 12 tablet, Rfl: 0    pantoprazole (PROTONIX) 40 MG EC tablet, Take 1 tablet by mouth Daily., Disp: 30 tablet, Rfl: 11    tamsulosin (FLOMAX) 0.4 MG capsule 24 hr capsule, Take 2 capsules by mouth Daily., Disp: 180 capsule, Rfl: 3    Past Medical History:   Diagnosis Date    Abnormal ECG     BPH (benign prostatic " "hyperplasia)     COVID-19     GERD (gastroesophageal reflux disease)     Stomach ulcer     Vertigo        Past Surgical History:   Procedure Laterality Date    COLONOSCOPY  2009    ENDOSCOPY  2009       Social History     Socioeconomic History    Marital status:    Tobacco Use    Smoking status: Never    Smokeless tobacco: Never   Vaping Use    Vaping status: Never Used   Substance and Sexual Activity    Alcohol use: No    Drug use: No    Sexual activity: Defer       Family History   Problem Relation Age of Onset    Cancer Father         kidney, bone cancer    Colon cancer Maternal Aunt     Coronary artery disease Neg Hx        BP (!) 153/104   Pulse 90   Resp 18   Ht 172.7 cm (68\")   Wt 79.4 kg (175 lb 0.7 oz)   SpO2 98%   BMI 26.62 kg/m²     Physical Exam  Constitutional:   Heart Rate:  [90-92] 90  Resp:  [18] 18  BP: (153)/(104) 153/104  ] Well developed, well nourished, having significant discomfort from bladder distention respiratory:   Effort unlabored; Movements symmetric  GI:   No mass or hernia noted, not distended or tender   No enlargement of spleen or liver noted  Skin:   No pallor or cyanosis; No obvious rash  Psych:   Alert, Oriented x 3  :   Bladder is palpably distended.   There is blood at the external urethral meatus of the penis.       Lab Results   Component Value Date    GLUCOSE 133 (H) 07/28/2025    BUN 13.1 07/28/2025    CREATININE 0.90 07/28/2025    EGFRIFNONA 93 11/08/2020    BCR 14.6 07/28/2025    CO2 19.0 (L) 07/28/2025    CALCIUM 9.3 07/28/2025    ALBUMIN 3.70 11/08/2020    AST 16 11/08/2020    ALT 15 11/08/2020     Lab Results   Component Value Date    GLUCOSE 133 (H) 07/28/2025    CALCIUM 9.3 07/28/2025     07/28/2025    K 3.5 07/28/2025    CO2 19.0 (L) 07/28/2025     07/28/2025    BUN 13.1 07/28/2025    CREATININE 0.90 07/28/2025    EGFRIFNONA 93 11/08/2020    BCR 14.6 07/28/2025    ANIONGAP 17.0 (H) 07/28/2025     Lab Results   Component Value Date    WBC " "7.39 07/28/2025    HGB 13.0 07/28/2025    HCT 38.3 07/28/2025    MCV 84.2 07/28/2025     07/28/2025     No results found for: \"PSA\"  No results found for: \"URINECX\"  Brief Urine Lab Results  (Last result in the past 365 days)        Color   Clarity   Blood   Leuk Est   Nitrite   Protein   CREAT   Urine HCG        05/29/25 1113 Yellow   Clear   Negative   Negative   Negative   Negative                     Imaging Results (Last 7 Days)       ** No results found for the last 168 hours. **            Assessment and Plan  Acute urinary retention  BPH with obstruction  Possible anterior urethral false passage    Patient's had another episode of acute urinary retention.  He is clearly having progression of his benign prostate hyperplasia with obstruction.  We will have to consider surgical intervention at some point.  Tonight he needs a catheter.  I explained the patient has not put the catheter in using a wire.  I do want him to continue his tamsulosin 0.4 mg daily.      Complex urethral indwelling cathter placement:   Clearly there have been unsuccessful attempts to place a Escalante catheter on him.  There is some blood noted at the external urethral meatus.  I had him lie flat and injected lidocaine jelly in the urethra initially.  This was followed by routine prep and drape using Betadine.  I used a 18 [place] tip catheter .  I then took a 0.035 mm ZipWire and wet this with sterile water.  This was followed by passing the wire carefully through the anterior urethra and manipulating this into the bladder. The catheter is then gently and carefully manipulated into the urinary bladder.  The balloon was inflated and the catheter irrigated pink tinged urine.        (Please note that portions of this note were completed with a voice recognition program.)  Bernardino Fenton MD  07/28/25  21:46 CDT            "

## 2025-07-29 NOTE — ED PROVIDER NOTES
Subjective   History of Present Illness  65-year-old male presents to the ED with complaint of suprapubic pain, urinary retention.  He has a history of enlarged prostate, states he has plans for aqua ablation at some point.  Has had a Escalante catheter in the past due to acute urinary retention.  He reports compliance with Flomax.  States he has been able to urinate the past 4 hours.  Complains of severe suprapubic pain and discomfort.  No nausea or vomiting, no fever, no flank pain.  Tried to self cath at home with no success, states he actually got some blood return from the catheter so came to the ED for further evaluation and Escalante placement.    History provided by:  Patient      Review of Systems   All other systems reviewed and are negative.      Past Medical History:   Diagnosis Date    Abnormal ECG     BPH (benign prostatic hyperplasia)     COVID-19     GERD (gastroesophageal reflux disease)     Stomach ulcer     Vertigo        No Known Allergies    Past Surgical History:   Procedure Laterality Date    COLONOSCOPY  2009    ENDOSCOPY  2009       Family History   Problem Relation Age of Onset    Cancer Father         kidney, bone cancer    Colon cancer Maternal Aunt     Coronary artery disease Neg Hx        Social History     Socioeconomic History    Marital status:    Tobacco Use    Smoking status: Never    Smokeless tobacco: Never   Vaping Use    Vaping status: Never Used   Substance and Sexual Activity    Alcohol use: No    Drug use: No    Sexual activity: Defer           Objective   Physical Exam  Vitals and nursing note reviewed.   Constitutional:       Appearance: He is normal weight.      Comments: A little anxious appearing, standing next to the bed pacing, appears uncomfortable due to pain   HENT:      Head: Normocephalic and atraumatic.      Nose: Nose normal. No congestion or rhinorrhea.   Cardiovascular:      Rate and Rhythm: Normal rate and regular rhythm.      Heart sounds: Normal heart  sounds.   Pulmonary:      Effort: Pulmonary effort is normal.      Breath sounds: Normal breath sounds. No wheezing, rhonchi or rales.   Abdominal:      General: Abdomen is flat. Bowel sounds are normal. There is distension.      Palpations: Abdomen is soft.      Tenderness: There is no abdominal tenderness. There is no guarding.      Comments: Suprapubic distention   Skin:     General: Skin is warm and dry.      Capillary Refill: Capillary refill takes less than 2 seconds.   Neurological:      General: No focal deficit present.      Mental Status: He is alert and oriented to person, place, and time. Mental status is at baseline.         Procedures       Lab Results (last 24 hours)       Procedure Component Value Units Date/Time    CBC & Differential [[id number]]  (Abnormal) Collected: 07/28/25 2109    Specimen: Blood Updated: 07/28/25 2119    Narrative:      The following orders were created for panel order CBC & Differential.  Procedure                               Abnormality         Status                     ---------                               -----------         ------                     CBC Auto Differential[[id number]]        Abnormal            Final result                 Please view results for these tests on the individual orders.    Protime-INR [[id number]]  (Normal) Collected: 07/28/25 2109    Specimen: Blood Updated: 07/28/25 2126     Protime 14.1 Seconds      INR 1.04    CBC Auto Differential [[id number]]  (Abnormal) Collected: 07/28/25 2109    Specimen: Blood Updated: 07/28/25 2119     WBC 7.39 10*3/mm3      RBC 4.55 10*6/mm3      Hemoglobin 13.0 g/dL      Hematocrit 38.3 %      MCV 84.2 fL      MCH 28.6 pg      MCHC 33.9 g/dL      RDW 12.6 %      RDW-SD 38.5 fl      MPV 9.2 fL      Platelets 188 10*3/mm3      Neutrophil % 83.3 %      Lymphocyte % 12.0 %      Monocyte % 4.1 %      Eosinophil % 0.1 %      Basophil % 0.1 %      Immature Grans % 0.4 %      Neutrophils, Absolute 6.15 10*3/mm3       Lymphocytes, Absolute 0.89 10*3/mm3      Monocytes, Absolute 0.30 10*3/mm3      Eosinophils, Absolute 0.01 10*3/mm3      Basophils, Absolute 0.01 10*3/mm3      Immature Grans, Absolute 0.03 10*3/mm3      nRBC 0.0 /100 WBC     Basic Metabolic Panel [[id number]]  (Abnormal) Collected: 07/28/25 2111    Specimen: Blood Updated: 07/28/25 2138     Glucose 133 mg/dL      BUN 13.1 mg/dL      Creatinine 0.90 mg/dL      Sodium 142 mmol/L      Potassium 3.5 mmol/L      Chloride 106 mmol/L      CO2 19.0 mmol/L      Calcium 9.3 mg/dL      BUN/Creatinine Ratio 14.6     Anion Gap 17.0 mmol/L      eGFR 94.2 mL/min/1.73     Narrative:      GFR Categories in Chronic Kidney Disease (CKD)              GFR Category          GFR (mL/min/1.73)    Interpretation  G1                    90 or greater        Normal or high (1)  G2                    60-89                Mild decrease (1)  G3a                   45-59                Mild to moderate decrease  G3b                   30-44                Moderate to severe decrease  G4                    15-29                Severe decrease  G5                    14 or less           Kidney failure    (1)In the absence of evidence of kidney disease, neither GFR category G1 or G2 fulfill the criteria for CKD.    eGFR calculation 2021 CKD-EPI creatinine equation, which does not include race as a factor    Urinalysis With Culture If Indicated - Indwelling Urethral Catheter [[id number]]  (Abnormal) Collected: 07/28/25 2147    Specimen: Urine from Indwelling Urethral Catheter Updated: 07/28/25 2219     Color, UA Red     Appearance, UA Cloudy     pH, UA 6.0     Specific Gravity, UA 1.012     Glucose, UA Negative     Ketones, UA Trace     Bilirubin, UA Negative     Blood, UA Large (3+)     Protein,  mg/dL (2+)     Leuk Esterase, UA Small (1+)     Nitrite, UA Negative     Urobilinogen, UA 1.0 E.U./dL    Narrative:      In absence of clinical symptoms, the presence of pyuria, bacteria, and/or nitrites  on the urinalysis result does not correlate with infection.    Urinalysis, Microscopic Only - Indwelling Urethral Catheter [[id number]]  (Abnormal) Collected: 07/28/25 2147    Specimen: Urine from Indwelling Urethral Catheter Updated: 07/28/25 2219     RBC, UA Too Numerous to Count /HPF      WBC, UA 0-2 /HPF      Comment: Urine culture not indicated.        Bacteria, UA None Seen /HPF      Squamous Epithelial Cells, UA None Seen /HPF      Yeast, UA None Seen /HPF      Hyaline Casts, UA None Seen /LPF      Methodology Manual Light Microscopy         No Radiology Exams Resulted Within Past 24 Hours     ED Course  ED Course as of 07/28/25 2339 Mon Jul 28, 2025 2336 65-year-old male with history of BPH and has required Escalante catheter placement in the past for urinary retention presents to the ED with complaint of acute urinary retention.  Patient does not usually self cath at home, but since he cannot urinate attempted to self cath, states he had pain and began to bleed from his urethra.  He came to the ED for further evaluation.  Dr. Fenton, the patient's urologist contacted the ED and  requested Escalante catheter placement.  Had 500 cc on bladder scan.  Escalante catheter was attempted by 2 ER nurses and by myself, unsuccessful.  We attempted with a 20 [place] three-way, 22 [place] three-way, 18 [place] coudé catheter.  All unsuccessful.  Contacted Dr. Fenton who came to the ED and was able to pass a catheter over a wire.  Did have some blood in the urine, we irrigated several times and urine now clear.  No evidence of obstructive uropathy.  Recommend patient follow-up with his urologist in about a week, continue Flomax.  Will need to reschedule his colonoscopy [AW]      ED Course User Index  [AW] Tanner Land MD                                                       Medical Decision Making  Amount and/or Complexity of Data Reviewed  Labs: ordered.    Risk  Prescription drug management.        Final diagnoses:    Acute urinary retention       ED Disposition  ED Disposition       ED Disposition   Discharge    Condition   Stable    Comment   --               Bernardino Fenton MD  [address]   [address]  [place] [id number] [phone]    In 1 week           Medication List        New Prescriptions      cefdinir 300 MG capsule  Commonly known as: OMNICEF  Take 1 capsule by mouth 2 (Two) Times a Day.               Where to Get Your Medications        These medications were sent to [facility] - [place], [place] - [address] - [phone]  - [phone] [place] [address] [address], [facility] [address]      Phone: [phone]   cefdinir 300 MG capsule            Tanner Land MD  07/28/25 2147

## 2025-07-29 NOTE — TELEPHONE ENCOUNTER
Pt did not show for scheduled procedure.  Will you please notify referring physician  He will need an ov prior to rescheduling  Ty

## 2025-07-31 ENCOUNTER — PREP FOR SURGERY (OUTPATIENT)
Dept: UROLOGY | Facility: CLINIC | Age: 65
End: 2025-07-31
Payer: MEDICARE

## 2025-07-31 DIAGNOSIS — R33.8 ACUTE URINARY RETENTION: ICD-10-CM

## 2025-07-31 DIAGNOSIS — N13.8 BPH WITH OBSTRUCTION/LOWER URINARY TRACT SYMPTOMS: Primary | ICD-10-CM

## 2025-07-31 DIAGNOSIS — N40.1 BPH WITH OBSTRUCTION/LOWER URINARY TRACT SYMPTOMS: Primary | ICD-10-CM

## 2025-08-01 ENCOUNTER — TELEPHONE (OUTPATIENT)
Dept: UROLOGY | Facility: CLINIC | Age: 65
End: 2025-08-01
Payer: MEDICARE

## 2025-08-01 NOTE — TELEPHONE ENCOUNTER
Called patient and scheduled him for a nurse visit for catheter removal on 8/4/25 at 8:30am.  Patient verbally agreed to time/date.

## 2025-08-01 NOTE — TELEPHONE ENCOUNTER
----- Message from Diane PEACOCK sent at 8/1/2025  8:29 AM CDT -----  Regarding: FW: F/U  I will schedule the procedure if you guys can knock out the other two.  ----- Message -----  From: Bernardino Fenton MD  Sent: 7/31/2025   4:42 PM CDT  To: Diane Weir MA  Subject: F/U                                              Please do the following with him:    - Nurse visit on Monday 8/4 to remove his catheter  -See me Thursday 8/7 to set up surgery  -Put on OR schedule for Wednesday 8/20 for aquablation. Case req is in.

## 2025-08-04 ENCOUNTER — TELEPHONE (OUTPATIENT)
Dept: UROLOGY | Facility: CLINIC | Age: 65
End: 2025-08-04
Payer: MEDICARE

## 2025-08-04 ENCOUNTER — PROCEDURE VISIT (OUTPATIENT)
Dept: UROLOGY | Facility: CLINIC | Age: 65
End: 2025-08-04
Payer: MEDICARE

## 2025-08-04 ENCOUNTER — HOSPITAL ENCOUNTER (EMERGENCY)
Facility: HOSPITAL | Age: 65
Discharge: HOME | End: 2025-08-04
Attending: EMERGENCY MEDICINE | Admitting: EMERGENCY MEDICINE
Payer: MEDICARE

## 2025-08-04 VITALS
RESPIRATION RATE: 15 BRPM | BODY MASS INDEX: 27.28 KG/M2 | HEIGHT: 68 IN | OXYGEN SATURATION: 99 % | SYSTOLIC BLOOD PRESSURE: 122 MMHG | HEART RATE: 68 BPM | TEMPERATURE: 97.9 F | WEIGHT: 180 LBS | DIASTOLIC BLOOD PRESSURE: 72 MMHG

## 2025-08-04 DIAGNOSIS — R33.9 URINARY RETENTION WITH INCOMPLETE BLADDER EMPTYING: Primary | ICD-10-CM

## 2025-08-04 DIAGNOSIS — N40.1 BPH WITH OBSTRUCTION/LOWER URINARY TRACT SYMPTOMS: Primary | ICD-10-CM

## 2025-08-04 DIAGNOSIS — N13.8 BPH WITH OBSTRUCTION/LOWER URINARY TRACT SYMPTOMS: Primary | ICD-10-CM

## 2025-08-04 PROCEDURE — 96374 THER/PROPH/DIAG INJ IV PUSH: CPT

## 2025-08-04 PROCEDURE — 99284 EMERGENCY DEPT VISIT MOD MDM: CPT | Performed by: UROLOGY

## 2025-08-04 PROCEDURE — 96375 TX/PRO/DX INJ NEW DRUG ADDON: CPT

## 2025-08-04 PROCEDURE — 25010000002 FENTANYL CITRATE (PF) 50 MCG/ML SOLUTION: Performed by: EMERGENCY MEDICINE

## 2025-08-04 PROCEDURE — 99283 EMERGENCY DEPT VISIT LOW MDM: CPT | Performed by: EMERGENCY MEDICINE

## 2025-08-04 PROCEDURE — 52000 CYSTOURETHROSCOPY: CPT | Performed by: UROLOGY

## 2025-08-04 PROCEDURE — 25010000002 ONDANSETRON PER 1 MG: Performed by: EMERGENCY MEDICINE

## 2025-08-04 RX ORDER — ONDANSETRON 2 MG/ML
4 INJECTION INTRAMUSCULAR; INTRAVENOUS ONCE
Status: COMPLETED | OUTPATIENT
Start: 2025-08-04 | End: 2025-08-04

## 2025-08-04 RX ORDER — SODIUM CHLORIDE 0.9 % (FLUSH) 0.9 %
10 SYRINGE (ML) INJECTION AS NEEDED
Status: DISCONTINUED | OUTPATIENT
Start: 2025-08-04 | End: 2025-08-05 | Stop reason: HOSPADM

## 2025-08-04 RX ORDER — FENTANYL CITRATE 50 UG/ML
25 INJECTION, SOLUTION INTRAMUSCULAR; INTRAVENOUS ONCE
Refills: 0 | Status: COMPLETED | OUTPATIENT
Start: 2025-08-04 | End: 2025-08-04

## 2025-08-04 RX ORDER — LIDOCAINE HYDROCHLORIDE 20 MG/ML
JELLY TOPICAL AS NEEDED
Status: DISCONTINUED | OUTPATIENT
Start: 2025-08-04 | End: 2025-08-05 | Stop reason: HOSPADM

## 2025-08-04 RX ORDER — LIDOCAINE HYDROCHLORIDE 20 MG/ML
JELLY TOPICAL
Status: ACTIVE | OUTPATIENT
Start: 2025-08-04 | End: 2025-08-04

## 2025-08-04 RX ADMIN — ONDANSETRON 4 MG: 2 INJECTION INTRAMUSCULAR; INTRAVENOUS at 22:19

## 2025-08-04 RX ADMIN — LIDOCAINE HYDROCHLORIDE: 20 JELLY TOPICAL at 21:56

## 2025-08-04 RX ADMIN — FENTANYL CITRATE 25 MCG: 50 INJECTION, SOLUTION INTRAMUSCULAR; INTRAVENOUS at 22:18

## 2025-08-04 RX ADMIN — LIDOCAINE HYDROCHLORIDE: 20 JELLY TOPICAL at 21:31

## 2025-08-05 ENCOUNTER — TELEPHONE (OUTPATIENT)
Dept: UROLOGY | Facility: CLINIC | Age: 65
End: 2025-08-05
Payer: MEDICARE

## 2025-08-07 ENCOUNTER — TELEPHONE (OUTPATIENT)
Dept: UROLOGY | Facility: CLINIC | Age: 65
End: 2025-08-07
Payer: MEDICARE

## 2025-08-07 ENCOUNTER — OFFICE VISIT (OUTPATIENT)
Dept: UROLOGY | Facility: CLINIC | Age: 65
End: 2025-08-07
Payer: MEDICARE

## 2025-08-07 VITALS — BODY MASS INDEX: 27.19 KG/M2 | TEMPERATURE: 97.4 F | WEIGHT: 179.4 LBS | HEIGHT: 68 IN

## 2025-08-07 DIAGNOSIS — N40.1 BPH WITH OBSTRUCTION/LOWER URINARY TRACT SYMPTOMS: Primary | ICD-10-CM

## 2025-08-07 DIAGNOSIS — N13.8 BPH WITH OBSTRUCTION/LOWER URINARY TRACT SYMPTOMS: Primary | ICD-10-CM

## 2025-08-07 DIAGNOSIS — R33.8 ACUTE URINARY RETENTION: ICD-10-CM

## 2025-08-07 RX ORDER — SODIUM CHLORIDE 0.9 % (FLUSH) 0.9 %
3-10 SYRINGE (ML) INJECTION AS NEEDED
OUTPATIENT
Start: 2025-08-07

## 2025-08-07 RX ORDER — SODIUM CHLORIDE 0.9 % (FLUSH) 0.9 %
3 SYRINGE (ML) INJECTION EVERY 12 HOURS SCHEDULED
OUTPATIENT
Start: 2025-08-07

## 2025-08-07 RX ORDER — SODIUM CHLORIDE 9 MG/ML
40 INJECTION, SOLUTION INTRAVENOUS AS NEEDED
OUTPATIENT
Start: 2025-08-07

## 2025-08-08 ENCOUNTER — PREP FOR SURGERY (OUTPATIENT)
Dept: UROLOGY | Facility: CLINIC | Age: 65
End: 2025-08-08
Payer: MEDICARE

## 2025-08-08 ENCOUNTER — TELEPHONE (OUTPATIENT)
Dept: UROLOGY | Facility: CLINIC | Age: 65
End: 2025-08-08
Payer: MEDICARE

## 2025-08-12 ENCOUNTER — TELEPHONE (OUTPATIENT)
Dept: UROLOGY | Facility: CLINIC | Age: 65
End: 2025-08-12
Payer: MEDICARE

## 2025-08-14 ENCOUNTER — TELEPHONE (OUTPATIENT)
Dept: UROLOGY | Facility: CLINIC | Age: 65
End: 2025-08-14
Payer: MEDICARE

## 2025-08-15 ENCOUNTER — PROCEDURE VISIT (OUTPATIENT)
Dept: UROLOGY | Facility: CLINIC | Age: 65
End: 2025-08-15
Payer: MEDICARE

## 2025-08-15 DIAGNOSIS — N40.1 BPH WITH OBSTRUCTION/LOWER URINARY TRACT SYMPTOMS: Primary | ICD-10-CM

## 2025-08-15 DIAGNOSIS — N13.8 BPH WITH OBSTRUCTION/LOWER URINARY TRACT SYMPTOMS: Primary | ICD-10-CM

## 2025-08-15 DIAGNOSIS — N32.89 BLADDER SPASMS: ICD-10-CM

## 2025-08-15 RX ORDER — CEFDINIR 300 MG/1
300 CAPSULE ORAL 2 TIMES DAILY
Qty: 6 CAPSULE | Refills: 0 | Status: SHIPPED | OUTPATIENT
Start: 2025-08-15

## 2025-08-15 RX ORDER — HYOSCYAMINE SULFATE 0.12 MG/1
0.12 TABLET SUBLINGUAL EVERY 6 HOURS PRN
Qty: 12 TABLET | Refills: 1 | Status: SHIPPED | OUTPATIENT
Start: 2025-08-15

## 2025-08-19 ENCOUNTER — TELEPHONE (OUTPATIENT)
Dept: UROLOGY | Facility: CLINIC | Age: 65
End: 2025-08-19
Payer: MEDICARE

## 2025-08-21 ENCOUNTER — OFFICE VISIT (OUTPATIENT)
Dept: UROLOGY | Facility: CLINIC | Age: 65
End: 2025-08-21
Payer: MEDICARE

## 2025-08-21 VITALS — WEIGHT: 177 LBS | TEMPERATURE: 97.2 F | BODY MASS INDEX: 27.78 KG/M2 | HEIGHT: 67 IN

## 2025-08-21 DIAGNOSIS — R33.8 ACUTE URINARY RETENTION: ICD-10-CM

## 2025-08-21 DIAGNOSIS — N45.1 EPIDIDYMITIS: ICD-10-CM

## 2025-08-21 DIAGNOSIS — N13.8 BPH WITH OBSTRUCTION/LOWER URINARY TRACT SYMPTOMS: Primary | ICD-10-CM

## 2025-08-21 DIAGNOSIS — N40.1 BPH WITH OBSTRUCTION/LOWER URINARY TRACT SYMPTOMS: Primary | ICD-10-CM

## 2025-08-21 PROCEDURE — 99213 OFFICE O/P EST LOW 20 MIN: CPT | Performed by: UROLOGY

## 2025-08-21 RX ORDER — LEVOFLOXACIN 500 MG/1
500 TABLET, FILM COATED ORAL DAILY
COMMUNITY

## 2025-08-26 ENCOUNTER — TELEPHONE (OUTPATIENT)
Dept: UROLOGY | Facility: CLINIC | Age: 65
End: 2025-08-26
Payer: MEDICARE

## 2025-08-29 ENCOUNTER — TELEPHONE (OUTPATIENT)
Dept: UROLOGY | Facility: CLINIC | Age: 65
End: 2025-08-29
Payer: MEDICARE